# Patient Record
Sex: MALE | Race: ASIAN | Employment: FULL TIME | URBAN - METROPOLITAN AREA
[De-identification: names, ages, dates, MRNs, and addresses within clinical notes are randomized per-mention and may not be internally consistent; named-entity substitution may affect disease eponyms.]

---

## 2022-05-25 ENCOUNTER — OFFICE VISIT (OUTPATIENT)
Dept: FAMILY MEDICINE | Facility: CLINIC | Age: 27
End: 2022-05-25
Payer: COMMERCIAL

## 2022-05-25 VITALS
OXYGEN SATURATION: 98 % | WEIGHT: 153.6 LBS | HEIGHT: 68 IN | BODY MASS INDEX: 23.28 KG/M2 | SYSTOLIC BLOOD PRESSURE: 122 MMHG | HEART RATE: 113 BPM | DIASTOLIC BLOOD PRESSURE: 80 MMHG

## 2022-05-25 DIAGNOSIS — F41.9 ANXIETY AND DEPRESSION: Primary | ICD-10-CM

## 2022-05-25 DIAGNOSIS — F32.A ANXIETY AND DEPRESSION: Primary | ICD-10-CM

## 2022-05-25 PROCEDURE — 99204 OFFICE O/P NEW MOD 45 MIN: CPT | Performed by: FAMILY MEDICINE

## 2022-05-25 RX ORDER — CYCLOBENZAPRINE HCL 10 MG
10 TABLET ORAL
COMMUNITY
Start: 2022-05-05 | End: 2022-07-07

## 2022-05-25 RX ORDER — PAROXETINE 10 MG/1
10 TABLET, FILM COATED ORAL EVERY MORNING
Qty: 30 TABLET | Refills: 3 | Status: SHIPPED | OUTPATIENT
Start: 2022-05-25 | End: 2022-07-07

## 2022-05-25 ASSESSMENT — PAIN SCALES - GENERAL: PAINLEVEL: MODERATE PAIN (4)

## 2022-05-25 NOTE — PROGRESS NOTES
Assessment & Plan     Anxiety and depression  We will institute therapy with the following medication and coordinate this with our mental health evaluation  - Adult Mental Health  Referral  - PARoxetine (PAXIL) 10 MG tablet  Dispense: 30 tablet; Refill: 3                   No follow-ups on file.    Montana Cobb MD  [facility] [place] Subjective Joonhyeok is a 27 year old who presents for the following health issues first visit for this very pleasant 27-year-old who was undergoing some anxiety and stress due to multiple moves of location in the last year.  His wife is a PhD candidate at [employer].  They are living in an apartment but trying to get a house.  All of their family come from [place].  Both  and wife are from [place].  Patient has had some upper back spasm and was treated at emergency room.  They do have a pet at home who gets walked regularly by the patient.  He does exercise on a regular basis.  He is working virtually at home.  He described his situation very well he is obviously  and is internalizing a lot of stress from moves job changes and we did explain to him the situation with anxiety depression.  I offered him psychotherapy which he readily accepted.  I am not taking new patients but he did request that I follow-up with this and I am willing to do so.  We will follow him up in 2 weeks.  We discussed the use of SSRIs and we will start him on paroxetine 10 mg once daily.  We did tell him he would take a couple of weeks to have an effect.  He is having some difficulty sleeping waking up at night and worried about he and his wife's overall situation.  Therapeutic response will be monitored here in 2 weeks.  I really enjoyed seeing him today.    HPI           Review of Systems   Constitutional, HEENT, cardiovascular, pulmonary, GI, , musculoskeletal, neuro, skin, endocrine and psych systems are negative, except as otherwise noted.     "  Objective    /80 (BP Location: Right arm, Patient Position: Sitting, Cuff Size: Adult Regular)   Pulse 113   Ht 1.715 m (5' 7.5\")   Wt 69.7 kg (153 lb 9.6 oz)   SpO2 98%   BMI 23.70 kg/m    Body mass index is 23.7 kg/m .  Physical Exam   GENERAL: healthy, alert and no distress  EYES: Eyes grossly normal to inspection, PERRL and conjunctivae and sclerae normal  HENT: ear canals and TM's normal, nose and mouth without ulcers or lesions  NECK: no adenopathy, no asymmetry, masses, or scars and thyroid normal to palpation  RESP: lungs clear to auscultation - no rales, rhonchi or wheezes  CV: regular rate and rhythm, normal S1 S2, no S3 or S4, no murmur, click or rub, no peripheral edema and peripheral pulses strong  ABDOMEN: soft, nontender, no hepatosplenomegaly, no masses and bowel sounds normal  MS: no gross musculoskeletal defects noted, no edema  SKIN: no suspicious lesions or rashes  NEURO: Normal strength and tone, mentation intact and speech normal  PSYCH: mentation appears normal, affect normal/bright                "

## 2022-05-29 ENCOUNTER — HEALTH MAINTENANCE LETTER (OUTPATIENT)
Age: 27
End: 2022-05-29

## 2022-06-08 ENCOUNTER — OFFICE VISIT (OUTPATIENT)
Dept: FAMILY MEDICINE | Facility: CLINIC | Age: 27
End: 2022-06-08
Payer: COMMERCIAL

## 2022-06-08 VITALS
HEIGHT: 68 IN | TEMPERATURE: 98.6 F | OXYGEN SATURATION: 98 % | SYSTOLIC BLOOD PRESSURE: 112 MMHG | HEART RATE: 79 BPM | DIASTOLIC BLOOD PRESSURE: 78 MMHG | BODY MASS INDEX: 23.33 KG/M2 | WEIGHT: 153.9 LBS

## 2022-06-08 DIAGNOSIS — F41.1 GENERALIZED ANXIETY DISORDER: Primary | ICD-10-CM

## 2022-06-08 PROCEDURE — 99214 OFFICE O/P EST MOD 30 MIN: CPT | Performed by: FAMILY MEDICINE

## 2022-06-08 RX ORDER — ONDANSETRON 4 MG/1
TABLET, ORALLY DISINTEGRATING ORAL
COMMUNITY
Start: 2021-11-26 | End: 2022-07-07

## 2022-06-08 RX ORDER — FAMOTIDINE 20 MG/1
20 TABLET, FILM COATED ORAL 2 TIMES DAILY
COMMUNITY
Start: 2021-11-26 | End: 2022-07-07

## 2022-06-08 ASSESSMENT — PAIN SCALES - GENERAL: PAINLEVEL: NO PAIN (0)

## 2022-06-08 NOTE — PROGRESS NOTES
Answers for HPI/ROS submitted by the patient on 5/25/2022  Your back pain is: new  What do you think is the original cause of your back pain?: lifting  When did you first notice your back pain? : in the last week  How would you describe your back pain? : burning  How often do you feel your back pain? : comes and goes  Where is your back pain located? : right upper back, left upper back  Where does your back pain spread? : right shoulder, left shoulder  Since you noticed your back pain, how has it changed? : gradually improving  Does your back pain interfere with your job?: Yes  On a scale of 1-10 (10 being the worst), how strong is your back pain?: 3  What makes your back pain worse? : certain positions, stress  Acupuncture:: helpful  Acetaminophen: not tried  Activity or Exercise: not tried  Chiropractor: not tried  Cold: not tried  Heat: not tried  Massage: not tried  Muscle relaxants : not tried  NSAIDS (Ibuprofen, Naproxen) : not tried  Assessment & Plan     Generalized anxiety disorder  Continue paroxetine at 10 mg                   No follow-ups on file.    Montana Cobb MD  [facility]   Joonhyeok is a 27 year old who presents for the following health issues I am seeing him for follow-up.  We did place him on paroxetine.  He did make some adjustments sought acupuncture.  He is continuing to hyperventilate and getting some numbness in his fingers.  We reassured him that he is blowing off too much carbon dioxide and this is causing his symptoms.  He is feels he is better on paroxetine we will hold his dose of 10 mg.  He is getting psychotherapy scheduled for June 22 of this month.  We will follow along with their recommendations.  I offered blood work but he said he had blood work done in [place] 3 to 4 months ago I would like to see that especially his blood sugars kidney function and hemogram.  All medical questions asked were answered overall I think patient is improving  "he certainly is sleeping better we will follow him up in 1 month he has moved into his new house and is currently awaiting a new driveway which is exciting  HPI           Review of Systems   Constitutional, HEENT, cardiovascular, pulmonary, gi and gu systems are negative, except as otherwise noted.      Objective    /78 (BP Location: Right arm, Patient Position: Sitting, Cuff Size: Adult Regular)   Pulse 79   Temp 98.6  F (37  C) (Oral)   Ht 1.715 m (5' 7.52\")   Wt 69.8 kg (153 lb 14.4 oz)   SpO2 98%   BMI 23.73 kg/m    Body mass index is 23.73 kg/m .  Physical Exam   GENERAL: healthy, alert and no distress  NECK: no adenopathy, no asymmetry, masses, or scars and thyroid normal to palpation  RESP: lungs clear to auscultation - no rales, rhonchi or wheezes  CV: regular rate and rhythm, normal S1 S2, no S3 or S4, no murmur, click or rub, no peripheral edema and peripheral pulses strong  ABDOMEN: soft, nontender, no hepatosplenomegaly, no masses and bowel sounds normal  MS: no gross musculoskeletal defects noted, no edema                Opioids: not tried  Physical Therapy: not tried  Rest: helpful  Steroid Injection: not tried  Stretching : not tried  Surgery: not tried  TENS Unit: not tried  Topical pain relievers : not tried  Do you see any other healthcare providers for your back pain? : None  How many servings of fruits and vegetables do you eat daily?: 0-1  On average, how many sweetened beverages do you drink each day (Examples: soda, juice, sweet tea, etc.  Do NOT count diet or artificially sweetened beverages)?: 0  How many minutes a day do you exercise enough to make your heart beat faster?: 30 to 60  How many days a week do you exercise enough to make your heart beat faster?: 3 or less  How many days per week do you miss taking your medication?: 0      "

## 2022-06-23 ENCOUNTER — VIRTUAL VISIT (OUTPATIENT)
Dept: PSYCHOLOGY | Facility: CLINIC | Age: 27
End: 2022-06-23
Attending: FAMILY MEDICINE
Payer: COMMERCIAL

## 2022-06-23 DIAGNOSIS — F43.23 ADJUSTMENT DISORDER WITH MIXED ANXIETY AND DEPRESSED MOOD: Primary | ICD-10-CM

## 2022-06-23 PROCEDURE — 90791 PSYCH DIAGNOSTIC EVALUATION: CPT | Mod: 95 | Performed by: COUNSELOR

## 2022-06-23 ASSESSMENT — COLUMBIA-SUICIDE SEVERITY RATING SCALE - C-SSRS
1. HAVE YOU WISHED YOU WERE DEAD OR WISHED YOU COULD GO TO SLEEP AND NOT WAKE UP?: NO
TOTAL  NUMBER OF INTERRUPTED ATTEMPTS LIFETIME: NO
ATTEMPT LIFETIME: NO
TOTAL  NUMBER OF ABORTED OR SELF INTERRUPTED ATTEMPTS LIFETIME: NO
6. HAVE YOU EVER DONE ANYTHING, STARTED TO DO ANYTHING, OR PREPARED TO DO ANYTHING TO END YOUR LIFE?: NO

## 2022-06-23 ASSESSMENT — ANXIETY QUESTIONNAIRES
5. BEING SO RESTLESS THAT IT IS HARD TO SIT STILL: NOT AT ALL
2. NOT BEING ABLE TO STOP OR CONTROL WORRYING: SEVERAL DAYS
6. BECOMING EASILY ANNOYED OR IRRITABLE: SEVERAL DAYS
GAD7 TOTAL SCORE: 6
3. WORRYING TOO MUCH ABOUT DIFFERENT THINGS: SEVERAL DAYS
7. FEELING AFRAID AS IF SOMETHING AWFUL MIGHT HAPPEN: SEVERAL DAYS
GAD7 TOTAL SCORE: 6
1. FEELING NERVOUS, ANXIOUS, OR ON EDGE: SEVERAL DAYS
4. TROUBLE RELAXING: SEVERAL DAYS

## 2022-06-23 ASSESSMENT — PATIENT HEALTH QUESTIONNAIRE - PHQ9: SUM OF ALL RESPONSES TO PHQ QUESTIONS 1-9: 4

## 2022-06-23 NOTE — PROGRESS NOTES
"[facility]  Provider Name: Gisell Hagercarmen      Credentials[facility]  [facility]    PATIENT'S NAME: Joonhyeok \"Solo\" Rula  PREFERRED NAME: \"Solo\"  PRONOUNS: he, his, him  MRN: [id number]  : 1995  ADDRESS: [facility] [address]  [place] [address]  [facility]  [id number]  DATE OF SERVICE: 22  START TIME: 11:00am  END TIME: 12:00pm  PREFERRED PHONE: [phone]  May we leave a program related message: Yes  SERVICE MODALITY:  Video Visit:      Provider verified identity through the following two step process.  Patient provided:  Patient  and Patient address    Telemedicine Visit: The patient's condition can be safely assessed and treated via synchronous audio and visual telemedicine encounter.      Reason for Telemedicine Visit: Services only offered telehealth    Originating Site (Patient Location): Patient's home    Distant Site (Provider Location): Provider Remote Setting- Home Office    Consent:  The patient/guardian has verbally consented to: the potential risks and benefits of telemedicine (video visit) versus in person care; bill my insurance or make self-payment for services provided; and responsibility for payment of non-covered services.     Patient would like the video invitation sent by:  My Chart    Mode of Communication:  Video Conference via [employer]    As the provider I attest to compliance with applicable laws and regulations related to telemedicine.    UNIVERSAL ADULT Mental Health DIAGNOSTIC ASSESSMENT    Identifying Information:  Patient is a 27 year old, Sami.  The pronoun use throughout this assessment reflects the patient's chosen pronoun.  Patient was referred for an assessment by primary care clinic.  Patient attended the session alone.    Chief Complaint:   The reason for seeking services at this time is: \"Anxiety, Stress, Fear\". Pt reports having a difficult year, with multiple life transitions since Dec. of 2021. Pt's reports experiencing a physical reaction " "of tingling in feet and hands and feeling like \"everything is tearing down\" beginning in April 2022. Then in early May pt reports hurting his back which led to anxiety related to health, leading to experience of stomach issues. This then led to feelings of depression, a disruption in sleep schedule and then stress at work. The problem(s) began in April 2022.    Patient has attempted to resolve these concerns in the past through accupuncture and medication management.    Social/Family History:  Patient reported they grew up in other [place].  They were raised by biological parents and grandmother.  Parents were always together.  Patient reported that their childhood was \"I was a good kid and was often afraid of my dad\". Patient reported his father was authoritative. Patient described their current relationships with family of origin as \"the relationship with my family was really good until lately when it became a stressor\". This has led to a strained relationship with his family and another life stressor.    The patient describes their cultural background as [place].  Cultural influences and impact on patient's life structure, values, norms, and healthcare: -.  Contextual influences on patient's health include: Individual Factors -.    These factors will be addressed in the Preliminary Treatment plan. Patient identified their preferred language to be English. Patient reported they does not need the assistance of an  or other support involved in therapy.     Patient reported had no significant delays in developmental tasks.   Patient's highest education level was college graduate  .  Patient identified the following learning problems: none reported.  Modifications will not be used to assist communication in therapy.  Patient reports they are  able to understand written materials.    Patient reported the following relationship history.  Patient's current relationship status is  for 9 months and " together for 4 years.   Patient identified their sexual orientation as heterosexual.  Patient reported having 0 child(berny). Patient identified pets; friends; spouse as part of their support system.  Patient identified the quality of these relationships as good,  .      Patient's current living/housing situation involves staying in own home/apartment.  The immediate members of family and household include Dipika Bowden, Rabia,Spouse and they report that housing is stable.    Patient is currently employed fulltime.  Patient reports their finances are obtained through employment. Patient does not identify finances as a current stressor.      Patient reported that they have not been involved with the legal system. Patient does not report being under probation/ parole/ jurisdiction.     Patient's Strengths and Limitations:  Patient identified the following strengths or resources that will help them succeed in treatment: insight and motivation. Things that may interfere with the patient's success in treatment include: none identified.     Assessments:  The following assessments were completed by patient for this visit:  GENERALIZED ANXIETY DISORDER SCALE  Over the last 2 weeks, how often have you been bothered by the following problems?    1. Feeling nervous, anxious, or on edge - Several Days  2. Not being able to stop or control worrying - Several Days  WHIT 2 Total Score - 2      PHQ-2 Score:   PHQ-2 ( 1999 [employer]) 5/25/2022   Q1: Little interest or pleasure in doing things 1   Q2: Feeling down, depressed or hopeless 0   PHQ-2 Score 1   Q1: Little interest or pleasure in doing things Several days   Q2: Feeling down, depressed or hopeless Not at all   PHQ-2 Score 1       PHQ9:   PHQ-9 SCORE 6/23/2022   PHQ-9 Total Score 4     GAD7:   WHIT-7 SCORE 6/23/2022   Total Score 6        CAGE-AID:   CAGE-AID Total Score 6/23/2022   Total Score 0   Total Score MyChart 0 (A total score of 2 or greater is considered clinically  significant)     PROMIS 10-Global Health (only subscores and total score):   PROMIS-10 Scores Only 6/23/2022   Global Mental Health Score 12   Global Physical Health Score 17   PROMIS TOTAL - SUBSCORES 29     [place] Suicide Severity Rating Scale (Lifetime/Recent)No flowsheet data found.    Personal and Family Medical History:  Patient does not report a family history of mental health concerns.  Patient reports family history is not on file.    Patient does not report Mental Health Diagnosis or Treatment.      Patient has had a physical exam to rule out medical causes for current symptoms.  Date of last physical exam was within the past year. Client was encouraged to follow up with PCP if symptoms were to develop. The patient has a [facility] Primary Care Provider, who is named Montana Cobb MD.  Patient reports the following current medical concerns: tingling in hands and feet and stomach issues and no current dental concerns.  Patient denies any issues with pain..   There are not significant appetite / nutritional concerns / weight changes.   Patient does not report a history of head injury / trauma / cognitive impairment.     Medication Adherence:  Patient reports not taking due to side effects.     Patient Allergies:  No Known Allergies   Therapist inquired about allergies. Patient to follow up with medication provider if allergies develop or persist.       Medical History:  No past medical history on file.      Current Mental Status Exam:   Appearance:  Appropriate    Eye Contact:  Good   Psychomotor:  Normal       Gait / station:  no problem  Attitude / Demeanor: Cooperative   Speech      Rate / Production: Normal/ Responsive      Volume:  Normal  volume      Language:  intact  Mood:   Normal  Affect:   Appropriate    Thought Content: Clear   Thought Process: Coherent       Associations: No loosening of associations  Insight:   Good   Judgment:  Intact    Orientation:  All  Attention/concentration: Good      Substance Use:  Patient did not report a family history of substance use concerns; see medical history section for details.  Patient has not received chemical dependency treatment in the past.  Patient has not ever been to detox.      Patient is not currently receiving any chemical dependency treatment.           Substance History of use Age of first use Date of last use     Pattern and duration of use (include amounts and frequency)   Alcohol never used       REPORTS SUBSTANCE USE: N/A   Cannabis   never used     REPORTS SUBSTANCE USE: N/A     Amphetamines   never used     REPORTS SUBSTANCE USE: N/A   Cocaine/crack    never used       REPORTS SUBSTANCE USE: N/A   Hallucinogens never used         REPORTS SUBSTANCE USE: N/A   Inhalants never used         REPORTS SUBSTANCE USE: N/A   Heroin never used         REPORTS SUBSTANCE USE: N/A   Other Opiates never used     REPORTS SUBSTANCE USE: N/A   Benzodiazepine   never used     REPORTS SUBSTANCE USE: N/A   Barbiturates never used     REPORTS SUBSTANCE USE: N/A   Over the counter meds never used     REPORTS SUBSTANCE USE: N/A   Caffeine Yes     Repots 1 cup of coffee per day   Nicotine  never used     REPORTS SUBSTANCE USE: N/A   Other substances not listed above:  Identify:  never used     REPORTS SUBSTANCE USE: N/A     Patient reported the following problems as a result of their substance use: no problems, not applicable.    Substance Use: No symptoms    Based on the negative CAGE score and clinical interview there  are not indications of drug or alcohol abuse.      Significant Losses / Trauma / Abuse / Neglect Issues:   Patient did serve in the . Between [id number] in [place].  There are indications or report of significant loss, trauma, abuse or neglect issues related to: are no indications and client denies any losses, trauma, abuse, or neglect concerns.   Concerns for possible neglect are not present.      Safety Assessment:   Patient denies current homicidal ideation and behaviors.  Patient denies current self-injurious ideation and behaviors.    Patient denied risk behaviors associated with substance use.  Patient denies any high risk behaviors associated with mental health symptoms.  Patient reports the following current concerns for their personal safety: None.  Patient reports there are not firearms in the house.        History of Safety Concerns:  Patient denied a history of homicidal ideation.     Patient denied a history of personal safety concerns.    Patient denied a history of assaultive behaviors.    Patient denied a history of sexual assault behaviors.     Patient denied a history of risk behaviors associated with substance use.  Patient denies any history of high risk behaviors associated with mental health symptoms.  Patient reports the following protective factors: forward or future oriented thinking; dedication to family or friends; regular sleep; regular physical activity; purpose; structured day; commitment to well being; positive social skills; financial stability    Risk Plan:  See Recommendations for Safety and Risk Management Plan    Review of Symptoms per patient report:  Depression: Change in sleep, Lack of interest, Change in energy level, Feelings of hopelessness, Low self-worth, Ruminations, Irritability and Feeling sad, down, or depressed  Maria Guadalupe:  No Symptoms  Psychosis: No Symptoms  Anxiety: Excessive worry, Nervousness, Sleep disturbance, Ruminations, Poor concentration and Irritability  Panic:  Palpitations, Tingling, Numbness, Sense of impending doom and Triggers not being able to sleep well for a few nights in a row - this occurred once   Post Traumatic Stress Disorder:  No Symptoms   Eating Disorder: No Symptoms  ADD / ADHD:  No symptoms  Conduct Disorder: No symptoms  Autism Spectrum Disorder: No symptoms  Obsessive Compulsive Disorder: No Symptoms    Patient reports the  following compulsive behaviors and treatment history: none reported.      Diagnostic Criteria:   Adjustment Disorder  A. The development of emotional or behavioral symptoms in response to an identifiable stressor(s) occurring within 3 months of the onset of the stressor(s)  B. These symptoms or behaviors are clinically significant, as evidenced by one or both of the following:       - Marked distress that is out of proportion to the severity/intensity of the stressor (with consideration for external context & culture)       - Significant impairment in social, occupational, or other important areas of functioning  C. The stress-related disturbance does not meet criteria for another disorder & is not not an exacerbation of another mental disorder  D. The symptoms do not represent normal bereavement  E. Once the stressor or its consequences have terminated, the symptoms do not persist for more than an additional 6 months       * Adjustment Disorder with Mixed Anxiety and Depressed Mood: The predominant manifestation is a combination of depression and anxiety    Functional Status:  Patient reports the following functional impairments:  work / vocational responsibilities.     Nonprogrammatic care:  Patient is requesting basic services to address current mental health concerns.    Clinical Summary:  1. Reason for assessment: feelings of anxiety and depression.  2. Psychosocial, Cultural and Contextual Factors: Pt is a 27 year old male, multiple recent life transitions, employed full time.  3. Principal DSM5 Diagnoses  (Sustained by DSM5 Criteria Listed Above):   Adjustment Disorders  309.28 (F43.23) With mixed anxiety and depressed mood.  4. Other Diagnoses that is relevant to services:   n/a  5. Provisional Diagnosis: n/a  6. Prognosis: Return to Normal Functioning and Expect Improvement.  7. Likely consequences of symptoms if not treated: symptoms will increase and pt will require a higher level of care.  8. Client  strengths include:  insightful, motivated and open to learning .     Recommendations:     1. Plan for Safety and Risk Management:   Recommended that patient call 911 or go to the local ED should there be a change in any of these risk factors..          Report to child / adult protection services was NA.     2. Patient's identified no cultural concerns identified.     3. Initial Treatment will focus on:    Anxiety - reduce experience of anxiety and depression.     4. Resources/Service Plan:    services are not indicated.   Modifications to assist communication are not indicated.   Additional disability accommodations are not indicated.      5. Collaboration:   Collaboration / coordination of treatment will be initiated with the following  support professionals: primary care physician.      6.  Referrals:   The following referral(s) will be initiated: n/a. Next Scheduled Appointment: 7/18/22.     A Release of Information has been obtained for the following: n/a.    7. MERRICK:    MERRICK:  Discussed the general effects of drugs and alcohol on health and well-being. Provider gave patient printed information about the effects of chemical use on their health and well being. Recommendations:  N/a .     8. Records:   These were reviewed at time of assessment.   Information in this assessment was obtained from the medical record and  provided by patient who is a good historian.    Patient will have open access to their mental health medical record.        Provider Name/ Credentials:  G[facility]ell RENAE, [facility]  June 23, 2022

## 2022-07-07 ENCOUNTER — OFFICE VISIT (OUTPATIENT)
Dept: FAMILY MEDICINE | Facility: CLINIC | Age: 27
End: 2022-07-07
Payer: COMMERCIAL

## 2022-07-07 VITALS
WEIGHT: 154 LBS | SYSTOLIC BLOOD PRESSURE: 126 MMHG | DIASTOLIC BLOOD PRESSURE: 84 MMHG | OXYGEN SATURATION: 99 % | HEART RATE: 75 BPM | BODY MASS INDEX: 23.75 KG/M2

## 2022-07-07 DIAGNOSIS — F41.9 ANXIETY AND DEPRESSION: Primary | ICD-10-CM

## 2022-07-07 DIAGNOSIS — F32.A ANXIETY AND DEPRESSION: Primary | ICD-10-CM

## 2022-07-07 PROCEDURE — 99214 OFFICE O/P EST MOD 30 MIN: CPT | Performed by: FAMILY MEDICINE

## 2022-07-07 RX ORDER — PAROXETINE 10 MG/1
10 TABLET, FILM COATED ORAL EVERY MORNING
Qty: 90 TABLET | Refills: 1 | Status: SHIPPED | OUTPATIENT
Start: 2022-07-07 | End: 2023-01-13

## 2022-07-07 ASSESSMENT — ANXIETY QUESTIONNAIRES
3. WORRYING TOO MUCH ABOUT DIFFERENT THINGS: SEVERAL DAYS
6. BECOMING EASILY ANNOYED OR IRRITABLE: NOT AT ALL
2. NOT BEING ABLE TO STOP OR CONTROL WORRYING: SEVERAL DAYS
8. IF YOU CHECKED OFF ANY PROBLEMS, HOW DIFFICULT HAVE THESE MADE IT FOR YOU TO DO YOUR WORK, TAKE CARE OF THINGS AT HOME, OR GET ALONG WITH OTHER PEOPLE?: SOMEWHAT DIFFICULT
1. FEELING NERVOUS, ANXIOUS, OR ON EDGE: SEVERAL DAYS
GAD7 TOTAL SCORE: 5
7. FEELING AFRAID AS IF SOMETHING AWFUL MIGHT HAPPEN: SEVERAL DAYS
5. BEING SO RESTLESS THAT IT IS HARD TO SIT STILL: NOT AT ALL
7. FEELING AFRAID AS IF SOMETHING AWFUL MIGHT HAPPEN: SEVERAL DAYS
GAD7 TOTAL SCORE: 5
GAD7 TOTAL SCORE: 5
4. TROUBLE RELAXING: SEVERAL DAYS

## 2022-07-07 ASSESSMENT — PATIENT HEALTH QUESTIONNAIRE - PHQ9
SUM OF ALL RESPONSES TO PHQ QUESTIONS 1-9: 2
SUM OF ALL RESPONSES TO PHQ QUESTIONS 1-9: 2
10. IF YOU CHECKED OFF ANY PROBLEMS, HOW DIFFICULT HAVE THESE PROBLEMS MADE IT FOR YOU TO DO YOUR WORK, TAKE CARE OF THINGS AT HOME, OR GET ALONG WITH OTHER PEOPLE: SOMEWHAT DIFFICULT

## 2022-07-07 NOTE — PROGRESS NOTES
Assessment & Plan     Anxiety and depression  Psychologist report was reviewed patient is doing very well we will hold him at the current dosage we will see him for follow-up 6 months  - PARoxetine (PAXIL) 10 MG tablet  Dispense: 90 tablet; Refill: 1                   No follow-ups on file.    Montana Cobb MD  [facility] [place] Subjective Joonhyeok is a 27 year old, presenting for the following health issues:  Follow Up and MOOD CHANGES      HPI   Patient is here for follow-up he is on 10 mg of paroxetine.  Diagnosis is adjustment disorder with anxiety and depression he is seeing psychology and that is going very well he scheduled for another visit here in 2 weeks.  Patient did have some constipation when he started his Paxil but that has cleared.  His mother-in-law is visiting and they are going to do a lot of traveling.  Overall he says he has noticed marked improvement.  He is having some fleeting aches and pains in his wrists and elbows and extremities which are abating rather dramatically as he stabilizes on his paroxetine.  We will give him a 90-day supply with 2 refills like to see him for follow-up in 6 months all medical questions asked were answered.  I do not feel further blood chemistry is indicated at this time pleasure to see him again.        Review of Systems   Constitutional, HEENT, cardiovascular, pulmonary, gi and gu systems are negative, except as otherwise noted.      Objective    /84   Pulse 75   Wt 69.9 kg (154 lb)   SpO2 99%   BMI 23.75 kg/m    Body mass index is 23.75 kg/m .  Physical Exam   GENERAL: healthy, alert and no distress  NECK: no adenopathy, no asymmetry, masses, or scars and thyroid normal to palpation  RESP: lungs clear to auscultation - no rales, rhonchi or wheezes  CV: regular rate and rhythm, normal S1 S2, no S3 or S4, no murmur, click or rub, no peripheral edema and peripheral pulses strong  ABDOMEN: soft, nontender, no hepatosplenomegaly,  no masses and bowel sounds normal  MS: no gross musculoskeletal defects noted, no edema                    .  ..

## 2022-07-15 ENCOUNTER — VIRTUAL VISIT (OUTPATIENT)
Dept: PSYCHOLOGY | Facility: CLINIC | Age: 27
End: 2022-07-15
Payer: COMMERCIAL

## 2022-07-15 DIAGNOSIS — F43.23 ADJUSTMENT DISORDER WITH MIXED ANXIETY AND DEPRESSED MOOD: Primary | ICD-10-CM

## 2022-07-15 PROCEDURE — 90834 PSYTX W PT 45 MINUTES: CPT | Mod: 95

## 2022-07-18 NOTE — PROGRESS NOTES
"[facility]                                     Progress Note    Patient Name: Joonhyeok Ahn  Date: 7/15/2022         Service Type: Individual      Session Start Time: 2:30PM  Session End Time: 3:15PM     Session Length: 45 mins    Session #: 1    Attendees: Client attended alone    Service Modality:  Video Visit:      Provider verified identity through the following two step process.  Patient provided:  Patient  and Patient address    Telemedicine Visit: The patient's condition can be safely assessed and treated via synchronous audio and visual telemedicine encounter.      Reason for Telemedicine Visit: Patient has requested telehealth visit    Originating Site (Patient Location): Patient's home    Distant Site (Provider Location): Provider Remote Setting- Home Office    Consent:  The patient/guardian has verbally consented to: the potential risks and benefits of telemedicine (video visit) versus in person care; bill my insurance or make self-payment for services provided; and responsibility for payment of non-covered services.     Patient would like the video invitation sent by:  My Chart    Mode of Communication:  Video Conference via Amwell    As the provider I attest to compliance with applicable laws and regulations related to telemedicine.    DATA  Interactive Complexity: No  Crisis: No        Progress Since Last Session (Related to Symptoms / Goals / Homework):   Symptoms: initial session     Homework: initial session       Episode of Care Goals: initial session      Current / Ongoing Stressors and Concerns:   According to D/A on 2022 \"The reason for seeking services at this time is: \"Anxiety, Stress, Fear\". Pt reports having a difficult year, with multiple life transitions since Dec. of 2021. Pt's reports experiencing a physical reaction of tingling in feet and hands and feeling like \"everything is tearing down\" beginning in 2022. Then in early May pt reports hurting his " "back which led to anxiety related to health, leading to experience of stomach issues. This then led to feelings of depression, a disruption in sleep schedule and then stress at work. The problem(s) began in April 2022.\"     Treatment Objective(s) Addressed in This Session:   Co- develop treatment plan  psyhcoeducation on ACT   Rapport building      Intervention:   ACT -  Met with patient to co develop treatment team. Psychoeducation was provided on ACT. Received informed consent for treatment services. Started rapport building. Briefly reviewed diagnostic assessment. Scheduled follow up sessions.     Assessments completed prior to visit:  The following assessments were completed by patient for this visit:  PHQ9:   PHQ-9 SCORE 6/23/2022 7/7/2022   PHQ-9 Total Score MyChart - 2 (Minimal depression)   PHQ-9 Total Score 4 2     GAD7:   WHIT-7 SCORE 6/23/2022 7/7/2022   Total Score - 5 (mild anxiety)   Total Score 6 5     PROMIS 10-Global Health (only subscores and total score):   PROMIS-10 Scores Only 6/23/2022   Global Mental Health Score 12   Global Physical Health Score 17   PROMIS TOTAL - SUBSCORES 29         ASSESSMENT: Current Emotional / Mental Status (status of significant symptoms):   Risk status (Self / Other harm or suicidal ideation)   Patient denies current fears or concerns for personal safety.   Patient denies current or recent suicidal ideation or behaviors.   Patient denies current or recent homicidal ideation or behaviors.   Patient denies current or recent self injurious behavior or ideation.   Patient denies other safety concerns.   Patient reports there has been no change in risk factors since their last session.     Patient reports there has been no change in protective factors since their last session.     Recommended that patient call 911 or go to the local ED should there be a change in any of these risk factors.     Appearance:   Appropriate    Eye Contact:   Good    Psychomotor Behavior: Normal "    Attitude:   Cooperative  Interested Friendly Pleasant   Orientation:   All   Speech    Rate / Production: Normal/ Responsive Talkative Normal     Volume:  Normal    Mood:    Normal   Affect:    Appropriate  Bright    Thought Content:  Clear    Thought Form:  Coherent  Logical    Insight:    Good      Medication Review:   No changes to current psychiatric medication(s)     Medication Compliance:   Yes     Changes in Health Issues:   None reported     Chemical Use Review:   Substance Use: Chemical use reviewed, no active concerns identified      Tobacco Use: No current tobacco use.      Diagnosis:  1. Adjustment disorder with mixed anxiety and depressed mood        Collateral Reports Completed:   Not Applicable    PLAN: (Patient Tasks / Therapist Tasks / Other)  Patient will practice daily journaling.         BOB Maria, LGSW     This note has been reviewed and I agree with the plan of care. This note is co-signed by Caitlin Felix [facility] Supervisor, on: July 21, 2022                                                       ______________________________________________________________________    Individual Treatment Plan    Patient's Name: Joonhyeok Ahn  YOB: 1995    Date of Creation: 7/15/2022  Date Treatment Plan Last Reviewed/Revised: n/a    DSM5 Diagnoses: Adjustment Disorders  309.28 (F43.23) With mixed anxiety and depressed mood  Psychosocial / Contextual Factors:  Pt is a 27 year old male, multiple recent life transitions, employed full time.  PROMIS (reviewed every 90 days): 6/23/2022 score 29    Referral / Collaboration:  Referral to another professional/service is not indicated at this time..    Anticipated number of session for this episode of care: 9-12 sessions  Anticipation frequency of session: Weekly  Anticipated Duration of each session: 38-52 minutes  Treatment plan will be reviewed in 90 days or when goals have been changed.       MeasurableTreatment Goal(s) related to  diagnosis / functional impairment(s)  Goal 1: Patient will learn new skills to handle difficult thoughts, feelings and sensations.    I will know I've met my goal when I can sleep through the night.      Objective #A (Patient Action)    Patient will identify 3-5 stressors which contribute to feelings of anxiety.  Status: New - Date: 7/15/2022     Intervention(s)  Therapist will teach mindfulness meditation. Therapist will assign homework identifying stressors.     Objective #B  Patient will identify 3-5 stressors which contribute to feelings of depression.  Status: New - Date: 7/15/2022     Intervention(s)  Therapist will teach mindfulness meditation. Therapist will assign homework identifying stressors.     Objective #C  Patient will identify 5-10 strategies to more effectively address stressors.  Status: New - Date: 7/15/2022     Intervention(s)  Therapist will teach the ACT hexaflex.      Patient has reviewed and agreed to the above plan.      Deisi ROJAS NIXON  July 15, 2022  This note has been reviewed and I agree with the treatment plan of care. This note is co-signed by Caitlin Felix [facility] Supervisor, on: July 21, 2022

## 2022-08-01 ENCOUNTER — TELEPHONE (OUTPATIENT)
Dept: FAMILY MEDICINE | Facility: CLINIC | Age: 27
End: 2022-08-01

## 2022-08-01 NOTE — TELEPHONE ENCOUNTER
My Chart message sent to patient to call our clinic for a sooner appointment.     Please assist patient in getting scheduled if possible.

## 2022-08-01 NOTE — TELEPHONE ENCOUNTER
Reason for Call:  Other appointment    Detailed comments: Pain everywhere, and wants to get a sooner appointment with doctor.    Phone Number Patient can be reached at: Cell number on file:    Telephone Information:   Mobile [phone]       Best Time: Anytime    Can we leave a detailed message on this number? YES    Call taken on 8/1/2022 at 1:10 PM by Camilla Salinas

## 2022-08-02 NOTE — TELEPHONE ENCOUNTER
Called patient and was unable to leave voicemail. Called patient's wife. Scheduled for this Thursday.

## 2022-08-04 ENCOUNTER — OFFICE VISIT (OUTPATIENT)
Dept: FAMILY MEDICINE | Facility: CLINIC | Age: 27
End: 2022-08-04
Payer: COMMERCIAL

## 2022-08-04 VITALS
HEART RATE: 75 BPM | BODY MASS INDEX: 23.19 KG/M2 | OXYGEN SATURATION: 99 % | HEIGHT: 68 IN | RESPIRATION RATE: 18 BRPM | WEIGHT: 153 LBS | SYSTOLIC BLOOD PRESSURE: 120 MMHG | DIASTOLIC BLOOD PRESSURE: 64 MMHG

## 2022-08-04 DIAGNOSIS — F41.9 ANXIETY AND DEPRESSION: Primary | ICD-10-CM

## 2022-08-04 DIAGNOSIS — F32.A ANXIETY AND DEPRESSION: Primary | ICD-10-CM

## 2022-08-04 PROCEDURE — 99214 OFFICE O/P EST MOD 30 MIN: CPT | Performed by: FAMILY MEDICINE

## 2022-08-04 RX ORDER — PAROXETINE 20 MG/1
20 TABLET, FILM COATED ORAL EVERY MORNING
Qty: 20 TABLET | Refills: 1 | Status: SHIPPED | OUTPATIENT
Start: 2022-08-04 | End: 2022-09-13

## 2022-08-04 ASSESSMENT — PATIENT HEALTH QUESTIONNAIRE - PHQ9: SUM OF ALL RESPONSES TO PHQ QUESTIONS 1-9: 5

## 2022-08-04 ASSESSMENT — PAIN SCALES - GENERAL: PAINLEVEL: MILD PAIN (2)

## 2022-08-04 NOTE — PROGRESS NOTES
"  Assessment & Plan     Anxiety and depression  Medication adjustment today we will increase the Paxil  - PARoxetine (PAXIL) 20 MG tablet  Dispense: 20 tablet; Refill: 1                   No follow-ups on file.    Montana Cobb MD  [facility]    Subjective Joonhyeok is a 27 year old presenting for the following health issues:  RECHECK and Depression    The patient is improving quite a bit on his 10 mg of Paxil but is having some breakthrough panic anxiety episodes especially when he goes out of town.  He had a little bit of trouble when he and his wife went to [place].  He has some questions about his progress.  We did review his psychologist recommendation and she seems to be helping him.  He is going to follow-up with her in 10 days.  I feel he is doing quite well but I think we can cut down on these panic attacks where he feels that he would like to be able to talk to a provider right away and reassure him by increasing his paroxetine to 20 mg daily and the patient agrees with that.  He is going out of town tomorrow they are going to [place] to celebrate the eminence of his wife's getting a PhD.  Which is celebratory event.  He really is doing much better his affect is improved he is smiling a lot more he is sleeping better it.  I think paroxetine is working for him I will see him for follow-up in 1 month and follow along with his psychologist I enjoyed seeing him today.  Time spent face-to-face and non-face-to-face with the patient today was 22 minutes.  HPI           Review of Systems   Constitutional, HEENT, cardiovascular, pulmonary, gi and gu systems are negative, except as otherwise noted.      Objective    /64   Pulse 75   Resp 18   Ht 1.715 m (5' 7.5\")   Wt 69.4 kg (153 lb)   SpO2 99%   BMI 23.61 kg/m    Body mass index is 23.61 kg/m .  Physical Exam   GENERAL: healthy, alert and no distress  NECK: no adenopathy, no asymmetry, masses, or scars and thyroid normal to " palpation  RESP: lungs clear to auscultation - no rales, rhonchi or wheezes  CV: regular rate and rhythm, normal S1 S2, no S3 or S4, no murmur, click or rub, no peripheral edema and peripheral pulses strong  ABDOMEN: soft, nontender, no hepatosplenomegaly, no masses and bowel sounds normal  MS: no gross musculoskeletal defects noted, no edema                    .  ..

## 2022-08-12 ENCOUNTER — VIRTUAL VISIT (OUTPATIENT)
Dept: PSYCHOLOGY | Facility: CLINIC | Age: 27
End: 2022-08-12
Payer: COMMERCIAL

## 2022-08-12 DIAGNOSIS — F43.23 ADJUSTMENT DISORDER WITH MIXED ANXIETY AND DEPRESSED MOOD: Primary | ICD-10-CM

## 2022-08-12 PROCEDURE — 90834 PSYTX W PT 45 MINUTES: CPT | Mod: 95

## 2022-08-12 NOTE — PROGRESS NOTES
"[facility]                                     Progress Note    Patient Name: Joonhyeok Ahn  Date: 2022         Service Type: Individual      Session Start Time: 2:30PM  Session End Time: 3:15PM     Session Length: 45 mins    Session #: 2    Attendees: Client attended alone    Service Modality:  Video Visit:      Provider verified identity through the following two step process.  Patient provided:  Patient  and Patient is known previously to provider    Telemedicine Visit: The patient's condition can be safely assessed and treated via synchronous audio and visual telemedicine encounter.      Reason for Telemedicine Visit: Patient has requested telehealth visit    Originating Site (Patient Location): Patient's home    Distant Site (Provider Location): Provider Remote Setting- Home Office    Consent:  The patient/guardian has verbally consented to: the potential risks and benefits of telemedicine (video visit) versus in person care; bill my insurance or make self-payment for services provided; and responsibility for payment of non-covered services.     Patient would like the video invitation sent by:  My Chart    Mode of Communication:  Video Conference via Amwell    As the provider I attest to compliance with applicable laws and regulations related to telemedicine.    DATA  Extended Session (53+ minutes): No  Interactive Complexity: No  Crisis: No        Progress Since Last Session (Related to Symptoms / Goals / Homework):   Symptoms: Improving per patient    Homework: Achieved / completed to satisfaction      Episode of Care Goals: Minimal progress - ACTION (Actively working towards change); Intervened by reinforcing change plan / affirming steps taken     Current / Ongoing Stressors and Concerns:   According to D/A on 2022 \"The reason for seeking services at this time is: \"Anxiety, Stress, Fear\". Pt reports having a difficult year, with multiple life transitions since " "2021. Pt's reports experiencing a physical reaction of tingling in feet and hands and feeling like \"everything is tearing down\" beginning in April 2022. Then in early May pt reports hurting his back which led to anxiety related to health, leading to experience of stomach issues. This then led to feelings of depression, a disruption in sleep schedule and then stress at work. The problem(s) began in April 2022.\"     Treatment Objective(s) Addressed in This Session:   use cognitive strategies identified in therapy to challenge anxious thoughts     Intervention:   ACT -  Met with patient to review goals and interventions. Provided active listening as patient gave brief update on their week. Patient processed feeling significantly better after trip to [place] and being able to get away and stay present. Discussed contacting with the present moment in everyday life not only on vacation. Reviewed mindfulness practices including breathing and journaling.     Assessments completed prior to visit:  The following assessments were completed by patient for this visit:  PHQ9:   PHQ-9 SCORE 6/23/2022 7/7/2022 8/4/2022   PHQ-9 Total Score MyChart - 2 (Minimal depression) -   PHQ-9 Total Score 4 2 5     GAD7:   WHIT-7 SCORE 6/23/2022 7/7/2022   Total Score - 5 (mild anxiety)   Total Score 6 5     PROMIS 10-Global Health (only subscores and total score):   PROMIS-10 Scores Only 6/23/2022   Global Mental Health Score 12   Global Physical Health Score 17   PROMIS TOTAL - SUBSCORES 29         ASSESSMENT: Current Emotional / Mental Status (status of significant symptoms):   Risk status (Self / Other harm or suicidal ideation)   Patient denies current fears or concerns for personal safety.   Patient denies current or recent suicidal ideation or behaviors.   Patient denies current or recent homicidal ideation or behaviors.   Patient denies current or recent self injurious behavior or ideation.   Patient denies other safety " concerns.   Patient reports there has been no change in risk factors since their last session.     Patient reports there has been no change in protective factors since their last session.     Recommended that patient call 911 or go to the local ED should there be a change in any of these risk factors.     Appearance:   Appropriate    Eye Contact:   Good    Psychomotor Behavior: Normal    Attitude:   Cooperative  Interested Friendly Pleasant   Orientation:   All   Speech    Rate / Production: Normal/ Responsive Talkative Normal     Volume:  Normal    Mood:    Normal   Affect:    Appropriate    Thought Content:  Clear    Thought Form:  Coherent  Logical    Insight:    Good      Medication Review:   No changes to current psychiatric medication(s)     Medication Compliance:   Yes     Changes in Health Issues:   None reported     Chemical Use Review:   Substance Use: Chemical use reviewed, no active concerns identified      Tobacco Use: No current tobacco use.      Diagnosis:  1. Adjustment disorder with mixed anxiety and depressed mood        Collateral Reports Completed:   Not Applicable    PLAN: (Patient Tasks / Therapist Tasks / Other)  Patient will practice daily journaling. Patient will purchase recommended book by Jude Bryant.         BOB Maria, SW     This note has been reviewed and I agree with the plan of care. This note is co-signed by Caitlin Felix [facility] Supervisor, on: August 13, 2022                                                      ______________________________________________________________________    Individual Treatment Plan    Patient's Name: Joonhyeok Ahn  YOB: 1995    Date of Creation: 7/15/2022  Date Treatment Plan Last Reviewed/Revised: n/a    DSM5 Diagnoses: Adjustment Disorders  309.28 (F43.23) With mixed anxiety and depressed mood  Psychosocial / Contextual Factors:  Pt is a 27 year old male, multiple recent life transitions, employed full time.  Ti Knight  (reviewed every 90 days): 6/23/2022 score 29    Referral / Collaboration:  Referral to another professional/service is not indicated at this time..    Anticipated number of session for this episode of care: 9-12 sessions  Anticipation frequency of session: Weekly  Anticipated Duration of each session: 38-52 minutes  Treatment plan will be reviewed in 90 days or when goals have been changed.       MeasurableTreatment Goal(s) related to diagnosis / functional impairment(s)  Goal 1: Patient will learn new skills to handle difficult thoughts, feelings and sensations.    I will know I've met my goal when I can sleep through the night.      Objective #A (Patient Action)    Patient will identify 3-5 stressors which contribute to feelings of anxiety.  Status: New - Date: 7/15/2022     Intervention(s)  Therapist will teach mindfulness meditation. Therapist will assign homework identifying stressors.     Objective #B  Patient will identify 3-5 stressors which contribute to feelings of depression.  Status: New - Date: 7/15/2022     Intervention(s)  Therapist will teach mindfulness meditation. Therapist will assign homework identifying stressors.     Objective #C  Patient will identify 5-10 strategies to more effectively address stressors.  Status: New - Date: 7/15/2022     Intervention(s)  Therapist will teach the ACT hexaflex.      Patient has reviewed and agreed to the above plan.      WHIT Sherman  July 15, 2022  This note has been reviewed and I agree with the treatment plan of care. This note is co-signed by [facility]lin [facility] Supervisor, on: July 21, 2022

## 2022-09-13 DIAGNOSIS — F41.9 ANXIETY AND DEPRESSION: ICD-10-CM

## 2022-09-13 DIAGNOSIS — F32.A ANXIETY AND DEPRESSION: ICD-10-CM

## 2022-09-13 RX ORDER — PAROXETINE 20 MG/1
TABLET, FILM COATED ORAL
Qty: 20 TABLET | Refills: 1 | Status: SHIPPED | OUTPATIENT
Start: 2022-09-13 | End: 2023-01-13

## 2022-09-13 NOTE — TELEPHONE ENCOUNTER
"Routing refill request to provider for review/approval because:  PHQ-9 <5 in past 6M. Last PHQ-9 8/4/22 score of 5.    Last Written Prescription Date:  8/4/22  Last Fill Quantity: 20,  # refills: 1   Last office visit provider:  8/4/22     Requested Prescriptions   Pending Prescriptions Disp Refills     PARoxetine (PAXIL) 20 MG tablet [Pharmacy Med Name: PAROXETINE 20MG TABLETS] 20 tablet 1     Sig: TAKE 1 TABLET(20 MG) BY MOUTH EVERY MORNING       SSRIs Protocol Failed - 9/13/2022  9:56 AM        Failed - PHQ-9 score less than 5 in past 6 months     Please review last PHQ-9 score.           Passed - Medication is active on med list        Passed - Patient is age 18 or older        Passed - Recent (6 mo) or future (30 days) visit within the authorizing provider's specialty     Patient had office visit in the last 6 months or has a visit in the next 30 days with authorizing provider or within the authorizing provider's specialty.  See \"Patient Info\" tab in inbasket, or \"Choose Columns\" in Meds & Orders section of the refill encounter.                 LIBRADO STRAUSS RN 09/13/22 9:56 AM  "

## 2022-10-03 ENCOUNTER — HEALTH MAINTENANCE LETTER (OUTPATIENT)
Age: 27
End: 2022-10-03

## 2023-01-13 ENCOUNTER — OFFICE VISIT (OUTPATIENT)
Dept: FAMILY MEDICINE | Facility: CLINIC | Age: 28
End: 2023-01-13
Payer: COMMERCIAL

## 2023-01-13 VITALS
HEIGHT: 68 IN | RESPIRATION RATE: 16 BRPM | TEMPERATURE: 98.5 F | WEIGHT: 151.25 LBS | DIASTOLIC BLOOD PRESSURE: 72 MMHG | SYSTOLIC BLOOD PRESSURE: 110 MMHG | BODY MASS INDEX: 22.92 KG/M2 | HEART RATE: 68 BPM

## 2023-01-13 DIAGNOSIS — K21.9 GASTROESOPHAGEAL REFLUX DISEASE WITHOUT ESOPHAGITIS: Primary | ICD-10-CM

## 2023-01-13 PROCEDURE — 99213 OFFICE O/P EST LOW 20 MIN: CPT

## 2023-01-13 RX ORDER — FAMOTIDINE 20 MG/1
20 TABLET, FILM COATED ORAL 2 TIMES DAILY
Qty: 60 TABLET | Refills: 1 | Status: SHIPPED | OUTPATIENT
Start: 2023-01-13 | End: 2023-03-14

## 2023-01-13 NOTE — PROGRESS NOTES
Problem List Items Addressed This Visit        Digestive    Gastroesophageal reflux disease without esophagitis - Primary    Relevant Medications    famotidine (PEPCID) 20 MG tablet     Symptoms are consistent with his previous episode of reflux that was treated with famotidine. He has no red flag symptoms on exam or history. Believe it is appropriate to repeat the therapy that has worked well in the past. Discussed 20mg BID and then decreasing to 20mg daily and then to as needed if symptoms are improving. Discussed lifestyle modifications related to reflux. Patient did mention his stress and anxiety have been elevated in the winter months as he struggles to get consistent physical exercise; he was at one time taking paroxetine with good success but is not interested in restarting this today. Discussed correlation between reflux and stress. Will follow up in one month if symptoms have not improved. Could consider step-up therapy with omeprazole at that time. If still having sensation of throat pressure, would order scope. Patient expressed an understanding of and agreement with the above plan. All question were answered.    CHANTEL Herman CNP on 1/13/2023 at 9:28 AM    Subjective Joonhyeok is a 27 year old who presents for the following health issues     Chief Complaint   Patient presents with     Gastrophageal Reflux     Off and on. Requesting RX.      Symptoms are intermittent and relatively mild. They include mild, dull epigastric pain. This is worsened by certain foods (like steak, spicy, heavy meals). Worse with stressful situations. Not necessarily correlated with a time of day. Has not done any OTC medications for his symptoms. No nausea or vomiting. No constipation or diarrhea. No blood in the stool. Some mild dysphagia, but more pressure. Able to eat and drink. All very consistent with his last visit.      Was seen in a clinic a number of years ago for a sensation of something stuck in his  "throat, chest pain and stomach pain. He was prescribed a medication for reflux and his symptoms improved immediately. Today, he reports the last year has been particularly stressful, including a move to [place], marriage, new jobs. He reports his symptoms historically have increased whenever he tries to 'get out of his comfort zone.'     History of Present Illness       Reason for visit:  Acid reflux  Symptom onset:  1-2 weeks ago  Symptom intensity:  Mild  Symptom progression:  Staying the same  Had these symptoms before:  Yes  Has tried/received treatment for these symptoms:  Yes  Previous treatment was successful:  Yes    He eats 0-1 servings of fruits and vegetables daily.He consumes 0 sweetened beverage(s) daily.He exercises with enough effort to increase his heart rate 30 to 60 minutes per day.  He exercises with enough effort to increase his heart rate 3 or less days per week.   He is taking medications regularly.     Review of Systems         Objective    /72 (BP Location: Left arm, Patient Position: Sitting, Cuff Size: Adult Regular)   Pulse 68   Temp 98.5  F (36.9  C) (Oral)   Resp 16   Ht 1.715 m (5' 7.5\")   Wt 68.6 kg (151 lb 4 oz)   BMI 23.34 kg/m    Body mass index is 23.34 kg/m .     Physical Exam  Vitals and nursing note reviewed.   Constitutional:       General: He is not in acute distress.     Appearance: Normal appearance. He is normal weight.   Cardiovascular:      Rate and Rhythm: Normal rate and regular rhythm.   Pulmonary:      Effort: Pulmonary effort is normal. No respiratory distress.   Abdominal:      General: Abdomen is flat. There is no distension.      Palpations: Abdomen is soft.      Tenderness: There is no abdominal tenderness. There is no guarding.   Skin:     General: Skin is warm.   Neurological:      General: No focal deficit present.      Mental Status: He is alert.   Psychiatric:         Mood and Affect: Mood normal.         Behavior: Behavior normal.         " Thought Content: Thought content normal.                    This note has been dictated using voice recognition software. Any grammatical or context distortions are unintentional and inherent to the software

## 2023-02-28 ENCOUNTER — OFFICE VISIT (OUTPATIENT)
Dept: FAMILY MEDICINE | Facility: CLINIC | Age: 28
End: 2023-02-28
Payer: COMMERCIAL

## 2023-02-28 VITALS
BODY MASS INDEX: 23.57 KG/M2 | RESPIRATION RATE: 16 BRPM | HEART RATE: 71 BPM | WEIGHT: 150.2 LBS | OXYGEN SATURATION: 99 % | DIASTOLIC BLOOD PRESSURE: 85 MMHG | SYSTOLIC BLOOD PRESSURE: 131 MMHG | HEIGHT: 67 IN

## 2023-02-28 DIAGNOSIS — F41.1 GENERALIZED ANXIETY DISORDER: Primary | ICD-10-CM

## 2023-02-28 PROCEDURE — 99214 OFFICE O/P EST MOD 30 MIN: CPT

## 2023-02-28 RX ORDER — PAROXETINE 10 MG/1
10 TABLET, FILM COATED ORAL EVERY MORNING
Qty: 90 TABLET | Refills: 0 | Status: SHIPPED | OUTPATIENT
Start: 2023-02-28 | End: 2023-03-16 | Stop reason: DRUGHIGH

## 2023-02-28 NOTE — ASSESSMENT & PLAN NOTE
Exacerbation of chronic condition. Discussed today that patient's nonspecific complaints such as intermittent mild joint/muscle pain, bothersome reflux appear relatively benign, however I believe that to uncontrolled anxiety are likely contributing to heightened perception of his symptoms.  Patient is in agreement today, and states that an almost identical situation happened last year, after which he sought care and was started on an SSRI with improvement.  He has been off of his Paxil for a number of months.  We have opted to restart his Paxil at 10 mg today.  Expected therapeutic effects and potential side effects discussed.  Review of his chart shows that he was maintained on 20 as his last dose, so discussed that he can double his 10 mg dosage after a couple weeks if he is not having improvement in his symptoms.  If, despite reinitiating his SSRI, he is still having health concerns, would pursue further work-up.  We discussed that he is due for an annual preventative exam, and encouraged him to schedule this with his primary care provider as he would like to continue care with Dr. Martínez.  At this time, they could follow-up on [facility] mental health and his reflux symptoms.  I am hesitant to attribute his mild, moving joint/muscle pains to the initiation of the famotidine, but it is listed as a potential side effect and I instructed him that he could reduce his dose/discontinue this medication and assess for improvement.  If he is unable to see Dr. Martínez, I encouraged him to follow-up with myself as needed.  Patient expressed understanding of and agreement with this plan.  All questions were answered.

## 2023-02-28 NOTE — PATIENT INSTRUCTIONS
Restart Paxil at 10mg. Let's try to stay at this dose, but if it is no controlling your anxiety after a month or so, you can double your dose to 20mg. If you end up doing this, send me a message and I will change the prescription.  I've also placed a referral for mental health counseling, which it looks like you have done in the past, if you find that helpful.  It is possible that the Pepcid is causing muscle/joint pains, although it happens in less than 1% of patients. You could try to decrease to once daily over the next week, and then stop and rely on TUMS for reflux symptoms.

## 2023-03-16 ENCOUNTER — OFFICE VISIT (OUTPATIENT)
Dept: FAMILY MEDICINE | Facility: CLINIC | Age: 28
End: 2023-03-16
Payer: COMMERCIAL

## 2023-03-16 VITALS
RESPIRATION RATE: 18 BRPM | TEMPERATURE: 97.5 F | WEIGHT: 146 LBS | SYSTOLIC BLOOD PRESSURE: 122 MMHG | BODY MASS INDEX: 22.13 KG/M2 | OXYGEN SATURATION: 97 % | HEIGHT: 68 IN | DIASTOLIC BLOOD PRESSURE: 66 MMHG | HEART RATE: 100 BPM

## 2023-03-16 DIAGNOSIS — Z00.00 ANNUAL PHYSICAL EXAM: Primary | ICD-10-CM

## 2023-03-16 DIAGNOSIS — F41.1 GENERALIZED ANXIETY DISORDER: ICD-10-CM

## 2023-03-16 DIAGNOSIS — K21.9 GASTROESOPHAGEAL REFLUX DISEASE WITHOUT ESOPHAGITIS: ICD-10-CM

## 2023-03-16 LAB
ALBUMIN SERPL BCG-MCNC: 4.9 G/DL (ref 3.5–5.2)
ALBUMIN UR-MCNC: 100 MG/DL
ALP SERPL-CCNC: 78 U/L (ref 40–129)
ALT SERPL W P-5'-P-CCNC: 31 U/L (ref 10–50)
ANION GAP SERPL CALCULATED.3IONS-SCNC: 13 MMOL/L (ref 7–15)
APPEARANCE UR: CLEAR
AST SERPL W P-5'-P-CCNC: 24 U/L (ref 10–50)
BACTERIA #/AREA URNS HPF: ABNORMAL /HPF
BILIRUB SERPL-MCNC: 0.6 MG/DL
BILIRUB UR QL STRIP: NEGATIVE
BUN SERPL-MCNC: 12.2 MG/DL (ref 6–20)
CALCIUM SERPL-MCNC: 10.2 MG/DL (ref 8.6–10)
CHLORIDE SERPL-SCNC: 105 MMOL/L (ref 98–107)
CHOLEST SERPL-MCNC: 229 MG/DL
COLOR UR AUTO: YELLOW
CREAT SERPL-MCNC: 0.97 MG/DL (ref 0.67–1.17)
DEPRECATED HCO3 PLAS-SCNC: 25 MMOL/L (ref 22–29)
ERYTHROCYTE [DISTWIDTH] IN BLOOD BY AUTOMATED COUNT: 11.6 % (ref 10–15)
GFR SERPL CREATININE-BSD FRML MDRD: >90 ML/MIN/1.73M2
GLUCOSE SERPL-MCNC: 102 MG/DL (ref 70–99)
GLUCOSE UR STRIP-MCNC: NEGATIVE MG/DL
HCT VFR BLD AUTO: 47 % (ref 40–53)
HDLC SERPL-MCNC: 66 MG/DL
HGB BLD-MCNC: 16.2 G/DL (ref 13.3–17.7)
HGB UR QL STRIP: ABNORMAL
KETONES UR STRIP-MCNC: NEGATIVE MG/DL
LDLC SERPL CALC-MCNC: 141 MG/DL
LEUKOCYTE ESTERASE UR QL STRIP: NEGATIVE
MCH RBC QN AUTO: 29.9 PG (ref 26.5–33)
MCHC RBC AUTO-ENTMCNC: 34.5 G/DL (ref 31.5–36.5)
MCV RBC AUTO: 87 FL (ref 78–100)
NITRATE UR QL: NEGATIVE
NONHDLC SERPL-MCNC: 163 MG/DL
PH UR STRIP: 7 [PH] (ref 5–8)
PLATELET # BLD AUTO: 177 10E3/UL (ref 150–450)
POTASSIUM SERPL-SCNC: 4.6 MMOL/L (ref 3.4–5.3)
PROT SERPL-MCNC: 7.6 G/DL (ref 6.4–8.3)
RBC # BLD AUTO: 5.42 10E6/UL (ref 4.4–5.9)
RBC #/AREA URNS AUTO: ABNORMAL /HPF
SODIUM SERPL-SCNC: 143 MMOL/L (ref 136–145)
SP GR UR STRIP: 1.02 (ref 1–1.03)
SPERM #/AREA URNS HPF: PRESENT /HPF
SQUAMOUS #/AREA URNS AUTO: ABNORMAL /LPF
TRIGL SERPL-MCNC: 111 MG/DL
UROBILINOGEN UR STRIP-ACNC: 0.2 E.U./DL
WBC # BLD AUTO: 5.3 10E3/UL (ref 4–11)
WBC #/AREA URNS AUTO: ABNORMAL /HPF

## 2023-03-16 PROCEDURE — 36415 COLL VENOUS BLD VENIPUNCTURE: CPT | Performed by: FAMILY MEDICINE

## 2023-03-16 PROCEDURE — 85027 COMPLETE CBC AUTOMATED: CPT | Performed by: FAMILY MEDICINE

## 2023-03-16 PROCEDURE — 99213 OFFICE O/P EST LOW 20 MIN: CPT | Mod: 25 | Performed by: FAMILY MEDICINE

## 2023-03-16 PROCEDURE — 80053 COMPREHEN METABOLIC PANEL: CPT | Performed by: FAMILY MEDICINE

## 2023-03-16 PROCEDURE — 80061 LIPID PANEL: CPT | Performed by: FAMILY MEDICINE

## 2023-03-16 PROCEDURE — 81001 URINALYSIS AUTO W/SCOPE: CPT | Performed by: FAMILY MEDICINE

## 2023-03-16 PROCEDURE — 99395 PREV VISIT EST AGE 18-39: CPT | Performed by: FAMILY MEDICINE

## 2023-03-16 RX ORDER — FAMOTIDINE 20 MG/1
20 TABLET, FILM COATED ORAL 2 TIMES DAILY
COMMUNITY

## 2023-03-16 RX ORDER — PAROXETINE 20 MG/1
20 TABLET, FILM COATED ORAL EVERY MORNING
Qty: 90 TABLET | Refills: 1 | Status: SHIPPED | OUTPATIENT
Start: 2023-03-16

## 2023-03-16 ASSESSMENT — ENCOUNTER SYMPTOMS
FEVER: 0
HEMATOCHEZIA: 0
DIARRHEA: 0
DIZZINESS: 0
ABDOMINAL PAIN: 0
CONSTIPATION: 0
ARTHRALGIAS: 0
NAUSEA: 0
SORE THROAT: 0
WEAKNESS: 0
SHORTNESS OF BREATH: 0
EYE PAIN: 0
HEADACHES: 0
DYSURIA: 0
HEMATURIA: 0
HEARTBURN: 0
PALPITATIONS: 0
JOINT SWELLING: 0
FREQUENCY: 0
CHILLS: 0
NERVOUS/ANXIOUS: 1
PARESTHESIAS: 0
COUGH: 0
MYALGIAS: 0

## 2023-03-16 ASSESSMENT — ANXIETY QUESTIONNAIRES
5. BEING SO RESTLESS THAT IT IS HARD TO SIT STILL: NOT AT ALL
GAD7 TOTAL SCORE: 9
6. BECOMING EASILY ANNOYED OR IRRITABLE: SEVERAL DAYS
4. TROUBLE RELAXING: NEARLY EVERY DAY
IF YOU CHECKED OFF ANY PROBLEMS ON THIS QUESTIONNAIRE, HOW DIFFICULT HAVE THESE PROBLEMS MADE IT FOR YOU TO DO YOUR WORK, TAKE CARE OF THINGS AT HOME, OR GET ALONG WITH OTHER PEOPLE: SOMEWHAT DIFFICULT
7. FEELING AFRAID AS IF SOMETHING AWFUL MIGHT HAPPEN: SEVERAL DAYS
7. FEELING AFRAID AS IF SOMETHING AWFUL MIGHT HAPPEN: SEVERAL DAYS
3. WORRYING TOO MUCH ABOUT DIFFERENT THINGS: SEVERAL DAYS
GAD7 TOTAL SCORE: 9
2. NOT BEING ABLE TO STOP OR CONTROL WORRYING: SEVERAL DAYS
1. FEELING NERVOUS, ANXIOUS, OR ON EDGE: MORE THAN HALF THE DAYS
GAD7 TOTAL SCORE: 9
8. IF YOU CHECKED OFF ANY PROBLEMS, HOW DIFFICULT HAVE THESE MADE IT FOR YOU TO DO YOUR WORK, TAKE CARE OF THINGS AT HOME, OR GET ALONG WITH OTHER PEOPLE?: SOMEWHAT DIFFICULT

## 2023-03-16 ASSESSMENT — PATIENT HEALTH QUESTIONNAIRE - PHQ9
SUM OF ALL RESPONSES TO PHQ QUESTIONS 1-9: 4
SUM OF ALL RESPONSES TO PHQ QUESTIONS 1-9: 4
10. IF YOU CHECKED OFF ANY PROBLEMS, HOW DIFFICULT HAVE THESE PROBLEMS MADE IT FOR YOU TO DO YOUR WORK, TAKE CARE OF THINGS AT HOME, OR GET ALONG WITH OTHER PEOPLE: SOMEWHAT DIFFICULT

## 2023-03-16 ASSESSMENT — PAIN SCALES - GENERAL: PAINLEVEL: MILD PAIN (2)

## 2023-03-16 NOTE — PROGRESS NOTES
SUBJECTIVE:   CC: Liza is an 27 year old who presents for preventative health visit.   Patient has been advised of split billing requirements and indicates understanding: Yes  Chief complaint: I want to talk about my medications; I want to talk about my stomach  History of present illness: And assessment: Liza presents here for follow-up and for a complete physical examination.  In the past we have treated him for acid reflux.  One of my partners followed him when I was out of town and placed him on famotidine and he is doing very well with that his reflux symptoms heartburn etc. have all but disappeared.  We also told him to plan on staying on this medication indefinitely as he is doing so well.  He has had a problem with anxiety and conversion reactions now he is having some muscle discomfort.  He was doing very well on paroxetine 20 mg in the past but he discontinued that on his own and gradually felt return of anxiety and more conversion reactions and therefore went back on Paxil 10 mg it does not seem to be working as well as the 20 mg.  We discussed this and his tendency to pay attention to bodily aches and pains a little more often than other people do and he therefore based on his success with paroxetine in the past of represcribed 20 mg and we advised him to stay on this and will follow him up on a 6-month basis he is anxious to check his blood and urine so we will do comprehensive profile lipid and urine studies today.  We did discuss the use of MyChart.  He did vacation in [place] this winter he is also going to [place] with his wife.  He is very happy that they purchased a real estate last year and they are settled in her new home at a reasonable rate that they can manage we had a nice discussion here and he is doing very well he looks well.  All medical questions asked were answered I personally reviewed family social history surgeries allergies problems list    Today's PHQ-2 Score:   PHQ-2 ( 1999  [employer]) 3/16/2023   Q1: Little interest or pleasure in doing things 1   Q2: Feeling down, depressed or hopeless 1   PHQ-2 Score 2   Q1: Little interest or pleasure in doing things Several days   Q2: Feeling down, depressed or hopeless Several days   PHQ-2 Score 2           Social History     Tobacco Use     Smoking status: Never     Smokeless tobacco: Never   Substance Use Topics     Alcohol use: Not on file         Alcohol Use 3/16/2023   Prescreen: >3 drinks/day or >7 drinks/week? No   No flowsheet data found.    Last PSA: No results found for: PSA    Reviewed orders with patient. Reviewed health maintenance and updated orders accordingly - Yes  Lab work is in process    Reviewed and updated as needed this visit by clinical staff    Reviewed and updated as needed this visit by Provider  Answers for HPI/ROS submitted by the patient on 3/16/2023  If you checked off any problems, how difficult have these problems made it for you to do your work, take care of things at home, or get along with other people?: Somewhat difficult  PHQ9 TOTAL SCORE: 4  WHIT 7 TOTAL SCORE: 9  Frequency of exercise:: 2-3 days/week  Getting at least 3 servings of Calcium per day:: Yes  Diet:: Other  Taking medications regularly:: Yes  Medication side effects:: Not applicable  Bi-annual eye exam:: Yes  Dental care twice a year:: NO  Sleep apnea or symptoms of sleep apnea:: None  Additional concerns today:: Yes  Duration of exercise:: 30-45 minutes          Review of Systems  CONSTITUTIONAL: NEGATIVE for fever, chills, change in weight  INTEGUMENTARY/SKIN: NEGATIVE for worrisome rashes, moles or lesions  EYES: NEGATIVE for vision changes or irritation  ENT: NEGATIVE for ear, mouth and throat problems  RESP: NEGATIVE for significant cough or SOB  CV: NEGATIVE for chest pain, palpitations or peripheral edema  GI: NEGATIVE for nausea, abdominal pain, heartburn, or change in bowel habits   male: negative for dysuria, hematuria, decreased urinary  "stream, erectile dysfunction, urethral discharge  MUSCULOSKELETAL: NEGATIVE for significant arthralgias or myalgia  NEURO: NEGATIVE for weakness, dizziness or paresthesias  PSYCHIATRIC: NEGATIVE for changes in mood or affect    OBJECTIVE:   /66   Pulse 100   Temp 97.5  F (36.4  C) (Temporal)   Resp 18   Ht 1.715 m (5' 7.5\")   Wt 66.2 kg (146 lb)   SpO2 97%   BMI 22.53 kg/m      Physical Exam  General Appearance:  Alert, cooperative, no distress  Head:  Normocephalic, no obvious abnormality  Ears: TM anatomy normal  Eyes:  PERRL, EOM's intact, conjunctiva and corneas clear  Nose:  Nares symmetrical, septum midline, mucosa pink, no sinus tenderness  Throat:  Lips, tongue, and mucosa are moist, pink, and intact  Neck:  Supple, symmetrical, trachea midline, no adenopathy; thyroid: no enlargement, symmetric,no tenderness/mass/nodules; no carotid bruit, no JVD  Back:  Symmetrical, no curvature, ROM normal, no CVA tenderness  Chest/Breast:  No mass or tenderness  Lungs:  Clear to auscultation bilaterally, respirations unlabored   Heart:  Normal PMI, regular rate & rhythm, S1 and S2 normal, no murmurs, rubs, or gallops  Abdomen:  Soft, non-tender, bowel sounds active all four quadrants, no mass, or organomegaly  Musculoskeletal:  Tone and strength strong and symmetrical, all extremities  Lymphatic:  No adenopathy  Skin/Hair/Nails:  Skin warm, dry, and intact, no rashes  Neurologic:  Alert and oriented x3, no cranial nerve deficits, normal strength and tone, gait steady  Extremities:  No edema.  Devin's sign negative.    Genitourinary: deferred  Pulses:  Equal bilaterally        ASSESSMENT/PLAN:       ICD-10-CM    1. Annual physical exam  Z00.00 PARoxetine (PAXIL) 20 MG tablet     CBC with platelets     Comprehensive metabolic panel     UA Macroscopic with reflex to Microscopic and Culture     Lipid panel reflex to direct LDL Fasting      2. Gastroesophageal reflux disease without esophagitis  K21.9       3. " Generalized anxiety disorder  F41.1           Patient has been advised of split billing requirements and indicates understanding: Yes      COUNSELING:   Reviewed preventive health counseling, as reflected in patient instructions       Regular exercise        He reports that he has never smoked. He has never used smokeless tobacco.        Montana Cobb MD  [facility]

## 2023-04-06 ENCOUNTER — VIRTUAL VISIT (OUTPATIENT)
Dept: PSYCHOLOGY | Facility: CLINIC | Age: 28
End: 2023-04-06
Payer: COMMERCIAL

## 2023-04-06 DIAGNOSIS — F41.1 GENERALIZED ANXIETY DISORDER: ICD-10-CM

## 2023-04-06 PROCEDURE — 90834 PSYTX W PT 45 MINUTES: CPT | Mod: VID | Performed by: COUNSELOR

## 2023-04-06 ASSESSMENT — ANXIETY QUESTIONNAIRES
1. FEELING NERVOUS, ANXIOUS, OR ON EDGE: SEVERAL DAYS
8. IF YOU CHECKED OFF ANY PROBLEMS, HOW DIFFICULT HAVE THESE MADE IT FOR YOU TO DO YOUR WORK, TAKE CARE OF THINGS AT HOME, OR GET ALONG WITH OTHER PEOPLE?: NOT DIFFICULT AT ALL
6. BECOMING EASILY ANNOYED OR IRRITABLE: NOT AT ALL
GAD7 TOTAL SCORE: 6
IF YOU CHECKED OFF ANY PROBLEMS ON THIS QUESTIONNAIRE, HOW DIFFICULT HAVE THESE PROBLEMS MADE IT FOR YOU TO DO YOUR WORK, TAKE CARE OF THINGS AT HOME, OR GET ALONG WITH OTHER PEOPLE: NOT DIFFICULT AT ALL
5. BEING SO RESTLESS THAT IT IS HARD TO SIT STILL: NOT AT ALL
2. NOT BEING ABLE TO STOP OR CONTROL WORRYING: MORE THAN HALF THE DAYS
7. FEELING AFRAID AS IF SOMETHING AWFUL MIGHT HAPPEN: NOT AT ALL
3. WORRYING TOO MUCH ABOUT DIFFERENT THINGS: SEVERAL DAYS
4. TROUBLE RELAXING: MORE THAN HALF THE DAYS
GAD7 TOTAL SCORE: 6
GAD7 TOTAL SCORE: 6
7. FEELING AFRAID AS IF SOMETHING AWFUL MIGHT HAPPEN: NOT AT ALL

## 2023-04-06 ASSESSMENT — COLUMBIA-SUICIDE SEVERITY RATING SCALE - C-SSRS
6. HAVE YOU EVER DONE ANYTHING, STARTED TO DO ANYTHING, OR PREPARED TO DO ANYTHING TO END YOUR LIFE?: NO
SUICIDE, SINCE LAST CONTACT: NO
TOTAL  NUMBER OF INTERRUPTED ATTEMPTS SINCE LAST CONTACT: NO
1. SINCE LAST CONTACT, HAVE YOU WISHED YOU WERE DEAD OR WISHED YOU COULD GO TO SLEEP AND NOT WAKE UP?: NO
TOTAL  NUMBER OF ABORTED OR SELF INTERRUPTED ATTEMPTS SINCE LAST CONTACT: NO
2. HAVE YOU ACTUALLY HAD ANY THOUGHTS OF KILLING YOURSELF?: NO
ATTEMPT SINCE LAST CONTACT: NO

## 2023-04-06 ASSESSMENT — PATIENT HEALTH QUESTIONNAIRE - PHQ9
SUM OF ALL RESPONSES TO PHQ QUESTIONS 1-9: 4
SUM OF ALL RESPONSES TO PHQ QUESTIONS 1-9: 4
10. IF YOU CHECKED OFF ANY PROBLEMS, HOW DIFFICULT HAVE THESE PROBLEMS MADE IT FOR YOU TO DO YOUR WORK, TAKE CARE OF THINGS AT HOME, OR GET ALONG WITH OTHER PEOPLE: NOT DIFFICULT AT ALL

## 2023-04-06 NOTE — PROGRESS NOTES
[facility]                                     Progress Note    Patient Name: Joonhyeok Ahn  Date: 2023           Service Type: Individual      Session Start Time: 8:00am  Session End Time: 8:50am     Session Length: 38-52 mins    Session #: 1    Attendees: Client attended alone    Service Modality:  Video Visit:      Provider verified identity through the following two step process.  Patient provided:  Patient  and Patient address    Telemedicine Visit: The patient's condition can be safely assessed and treated via synchronous audio and visual telemedicine encounter.      Reason for Telemedicine Visit: Services only offered telehealth    Originating Site (Patient Location): Patient's home    Distant Site (Provider Location): Provider Remote Setting- Home Office    Consent:  The patient/guardian has verbally consented to: the potential risks and benefits of telemedicine (video visit) versus in person care; bill my insurance or make self-payment for services provided; and responsibility for payment of non-covered services.     Patient would like the video invitation sent by:  My Chart    Mode of Communication:  Video Conference via [employer]    Distant Location (Provider):  Off-site    As the provider I attest to compliance with applicable laws and regulations related to telemedicine.    DATA  Interactive Complexity: No  Crisis: No        Progress Since Last Session (Related to Symptoms / Goals / Homework):   Symptoms: No change initial stages of treatment    Homework: Completed in session      Episode of Care Goals: No improvement - PREPARATION (Decided to change - considering how); Intervened by negotiating a change plan and determining options / strategies for behavior change, identifying triggers, exploring social supports, and working towards setting a date to begin behavior change     Current / Ongoing Stressors and Concerns:   Pt reports experiencing an increase in anxiety and  "noticing that he \"fixates on things\". He reports this occurs when experiencing health issues and work stress. He notices that this thought pattern has been \"fuel\" for professional progress. He reports the thought of \"I'm not good enough\" which leads to fixating on a next step and an increase in anxiety.     Treatment Objective(s) Addressed in This Session:   Objective #A (Client Action)  Patient will describe situations, thoughts, feelings, and actions associated with anxieties and worries, their impact on functioning and attempts to resolve them. Patient will do this 4 out of 7 days of the week.   Objective #B  Patient will use cognitive strategies identified in therapy to challenge negative thought patterns 90% of the time.  Objective #C  Patient will identify and use at least three coping skills and distraction and diversion activities to manage feelings of anxiety. Pt will use these skills at least three times per week.         Intervention:   Therapist met with patient to develop goals and interventions. Therapist utilized person centered therapy to identify patient's desires for therapy and pt stated a desire to work on feelings of anxiety. Therapist worked to build rapport with the patient and listened with empathy as the patient spoke in more depth regarding their experience. Therapist worked to normalize the pt's experience and work toward goal centered talk. Together pt and therapist worked on solidifying treatment goals and interventions for future therapy work.       Assessments completed prior to visit:  The following assessments were completed by patient for this visit:  PHQ9:       6/23/2022    11:00 AM 7/7/2022     9:22 AM 8/4/2022     9:51 AM 3/16/2023     8:34 AM 4/6/2023     7:47 AM   PHQ-9 SCORE   PHQ-9 Total Score MyChart  2 (Minimal depression)  4 (Minimal depression) 4 (Minimal depression)   PHQ-9 Total Score 4 2 5 4 4     GAD7:       6/23/2022    11:00 AM 7/7/2022     9:22 AM 3/16/2023     " "8:35 AM 4/6/2023     7:47 AM   WHIT-7 SCORE   Total Score  5 (mild anxiety) 9 (mild anxiety) 6 (mild anxiety)   Total Score 6 5 9 6         ASSESSMENT: Current Emotional / Mental Status (status of significant symptoms):   Risk status (Self / Other harm or suicidal ideation)   Patient denies current fears or concerns for personal safety.   Patient denies current or recent suicidal ideation or behaviors.   Patient denies current or recent homicidal ideation or behaviors.   Patient denies current or recent self injurious behavior or ideation.   Patient denies other safety concerns.   Patient reports there has been no change in risk factors since their last session.     Patient reports there has been no change in protective factors since their last session.     Recommended that patient call 911 or go to the local ED should there be a change in any of these risk factors.     Appearance:   Appropriate    Eye Contact:   Good    Psychomotor Behavior: Normal    Attitude:   Cooperative    Orientation:   All   Speech    Rate / Production: Normal     Volume:  Normal    Mood:    Normal   Affect:    Appropriate  Worrisome    Thought Content:  Clear    Thought Form:  Coherent  Logical    Insight:    Good      Medication Review:   No changes to current psychiatric medication(s)     Medication Compliance:   Yes     Changes in Health Issues:   None reported     Chemical Use Review:   Substance Use: Chemical use reviewed, no active concerns identified      Tobacco Use: No current tobacco use.      Diagnosis:  1. Generalized anxiety disorder        Collateral Reports Completed:   Not Applicable    PLAN: (Patient Tasks / Therapist Tasks / Other)  1) pt will increase awareness of anxious thoughts, 3 times per week  2) Pt will continue working through \"mind over mood\" wkbk  3) next session is scheduled for 3 weeks out        STEVIE Lemus [facility]                                                     "     ______________________________________________________________________    Individual Treatment Plan    Patient's Name: Joonhyeok Ahn  YOB: 1995    Date of Creation: 4/6/2023  Date Treatment Plan Last Reviewed/Revised: next review due on 7/6/23    DSM5 Diagnoses: 300.02 (F41.1) Generalized Anxiety Disorder  Psychosocial / Contextual Factors: 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    Anticipated number of session for this episode of care: 18-24  Anticipation frequency of session: Every other week  Anticipated Duration of each session: 38-52 minutes  Treatment plan will be reviewed in 90 days or when goals have been changed.       MeasurableTreatment Goal(s) related to diagnosis / functional impairment(s)  Goal- Anxiety: Patient will reduce overall frequency, intensity, and duration of the anxiety so that daily functioning is not impaired.     I will know I've met my goal when I have found an answer to midigate the anxiety that work.      Objective #A (Client Action)  Patient will describe situations, thoughts, feelings, and actions associated with anxieties and worries, their impact on functioning and attempts to resolve them. Patient will do this 4 out of 7 days of the week.   Intervention(s)  Therapist will provide psychoeducation, behavioral activation, and cognitive restructuring.  Status: New - Date: 4/6/2023    Objective #B  Patient will use cognitive strategies identified in therapy to challenge negative thought patterns 90% of the time.  Intervention(s)  Therapist will provide psychoeducation, behavioral activation, and cognitive restructuring.  Status: New - Date: 4/6/2023    Objective #C  Patient will identify and use at  least three coping skills and distraction and diversion activities to manage feelings of anxiety. Pt will use these skills at least three times per week.  Intervention(s)  Therapist will provide psychoeducation, behavioral activation, and cognitive restructuring.  Status: New - Date: 4/6/2023      Patient has reviewed and agreed to the above plan.      STEVIE Lemus, [facility]  April 6, 2023

## 2023-04-11 ENCOUNTER — OFFICE VISIT (OUTPATIENT)
Dept: FAMILY MEDICINE | Facility: CLINIC | Age: 28
End: 2023-04-11
Payer: COMMERCIAL

## 2023-04-11 VITALS
OXYGEN SATURATION: 98 % | HEART RATE: 73 BPM | RESPIRATION RATE: 7 BRPM | HEIGHT: 68 IN | TEMPERATURE: 97.7 F | WEIGHT: 150.4 LBS | BODY MASS INDEX: 22.79 KG/M2 | SYSTOLIC BLOOD PRESSURE: 107 MMHG | DIASTOLIC BLOOD PRESSURE: 70 MMHG

## 2023-04-11 DIAGNOSIS — F41.1 GENERALIZED ANXIETY DISORDER: ICD-10-CM

## 2023-04-11 DIAGNOSIS — K21.9 GASTROESOPHAGEAL REFLUX DISEASE WITHOUT ESOPHAGITIS: Primary | ICD-10-CM

## 2023-04-11 PROCEDURE — 99214 OFFICE O/P EST MOD 30 MIN: CPT | Performed by: FAMILY MEDICINE

## 2023-04-11 ASSESSMENT — PAIN SCALES - GENERAL: PAINLEVEL: MILD PAIN (2)

## 2023-04-12 NOTE — PROGRESS NOTES
"  Assessment & Plan     Gastroesophageal reflux disease without esophagitis  Patient will continue to use famotidine 20 mg once daily  Generalized anxiety:- Patient will follow along with psychology; also will  Continue to use paroxetine 20 mg once daily               Montana Cobb MD  [facility] Healthcare[facility]USIE De Jseus is a 28 year old, presenting for the following health issues:  RECHECK (FOLLOW-UP FOR HIS LAST VISIT  - AND TEST RESULTS )        4/11/2023     8:29 AM   Additional Questions   Roomed by ANIKET KENNEDY             Review of Systems   Constitutional, HEENT, cardiovascular, pulmonary, gi and gu systems are negative, except as otherwise noted.      Objective    /70 (BP Location: Left arm, Patient Position: Sitting, Cuff Size: Adult Regular)   Pulse 73   Temp 97.7  F (36.5  C) (Oral)   Resp (!) 7   Ht 1.72 m (5' 7.72\")   Wt 68.2 kg (150 lb 6.4 oz)   SpO2 98%   BMI 23.06 kg/m    Body mass index is 23.06 kg/m .  Physical Exam   GENERAL: healthy, alert and no distress  NECK: no adenopathy, no asymmetry, masses, or scars and thyroid normal to palpation  RESP: lungs clear to auscultation - no rales, rhonchi or wheezes  CV: regular rate and rhythm, normal S1 S2, no S3 or S4, no murmur, click or rub, no peripheral edema and peripheral pulses strong  ABDOMEN: soft, nontender, no hepatosplenomegaly, no masses and bowel sounds normal  MS: no gross musculoskeletal defects noted, no edema                    "

## 2023-04-12 NOTE — PROGRESS NOTES
Answers for HPI/ROS submitted by the patient on 4/11/2023  What is the reason for your visit today? : FOLLOW UP  How many servings of fruits and vegetables do you eat daily?: 2-3  On average, how many sweetened beverages do you drink each day (Examples: soda, juice, sweet tea, etc.  Do NOT count diet or artificially sweetened beverages)?: 0  How many minutes a day do you exercise enough to make your heart beat faster?: 30 to 60  How many days a week do you exercise enough to make your heart beat faster?: 3 or less  How many days per week do you miss taking your medication?: 0

## 2023-04-20 ENCOUNTER — VIRTUAL VISIT (OUTPATIENT)
Dept: PSYCHOLOGY | Facility: CLINIC | Age: 28
End: 2023-04-20
Payer: COMMERCIAL

## 2023-04-20 DIAGNOSIS — F41.1 GENERALIZED ANXIETY DISORDER: Primary | ICD-10-CM

## 2023-04-20 PROCEDURE — 90834 PSYTX W PT 45 MINUTES: CPT | Mod: VID | Performed by: COUNSELOR

## 2023-04-20 NOTE — PROGRESS NOTES
[facility]                                     Progress Note    Patient Name: Joonhyeok Ahn  Date: 2023           Service Type: Individual      Session Start Time: 8:00am  Session End Time: 8:50am     Session Length: 38-52 mins    Session #: 2    Attendees: Client attended alone    Service Modality:  Video Visit:      Provider verified identity through the following two step process.  Patient provided:  Patient  and Patient address    Telemedicine Visit: The patient's condition can be safely assessed and treated via synchronous audio and visual telemedicine encounter.      Reason for Telemedicine Visit: Services only offered telehealth    Originating Site (Patient Location): Patient's home    Distant Site (Provider Location): Provider Remote Setting- Home Office    Consent:  The patient/guardian has verbally consented to: the potential risks and benefits of telemedicine (video visit) versus in person care; bill my insurance or make self-payment for services provided; and responsibility for payment of non-covered services.     Patient would like the video invitation sent by:  My Chart    Mode of Communication:  Video Conference via Amwell    Distant Location (Provider):  Off-site    As the provider I attest to compliance with applicable laws and regulations related to telemedicine.    DATA  Interactive Complexity: No  Crisis: No        Progress Since Last Session (Related to Symptoms / Goals / Homework):   Symptoms: No change initial stages of treatment    Homework: Completed in session      Episode of Care Goals: Minimal progress - PREPARATION (Decided to change - considering how); Intervened by negotiating a change plan and determining options / strategies for behavior change, identifying triggers, exploring social supports, and working towards setting a date to begin behavior change     Current / Ongoing Stressors and Concerns:   Pt reports experiencing an increase in anxiety and  "noticing that he \"fixates on things\". He reports this occurs when experiencing health issues and work stress. He notices that this thought pattern has been \"fuel\" for professional progress. He reports the thought of \"I'm not good enough\" which leads to fixating on a next step and an increase in anxiety.     Treatment Objective(s) Addressed in This Session:   Objective #A (Client Action)  Patient will describe situations, thoughts, feelings, and actions associated with anxieties and worries, their impact on functioning and attempts to resolve them. Patient will do this 4 out of 7 days of the week.   Objective #B  Patient will use cognitive strategies identified in therapy to challenge negative thought patterns 90% of the time.  Objective #C  Patient will identify and use at least three coping skills and distraction and diversion activities to manage feelings of anxiety. Pt will use these skills at least three times per week.         Intervention:   Therapist met with patient to review goals and interventions. Therapist utilized reflective listening as patient gave brief reflection of the week. Patient processed feelings of anxiety.   Discussed mindfulness as being aware of what we are experiencing while we are experiencing it.  Contrasted this with avoidance and rumination.  Explored the role of mindfulness as an overall coping strategy for managing depression, anxiety, and strong emotions.  Explained concept of state of mind using SIFT (sensations, images, feelings, thoughts) pneumonic. Discussed mindfulness as a tool to intentionally shift our awareness and focus as needed.      Assessments completed prior to visit:  The following assessments were completed by patient for this visit:  PHQ9:       6/23/2022    11:00 AM 7/7/2022     9:22 AM 8/4/2022     9:51 AM 3/16/2023     8:34 AM 4/6/2023     7:47 AM   PHQ-9 SCORE   PHQ-9 Total Score Ruth  2 (Minimal depression)  4 (Minimal depression) 4 (Minimal depression) "   PHQ-9 Total Score 4 2 5 4 4     GAD7:       6/23/2022    11:00 AM 7/7/2022     9:22 AM 3/16/2023     8:35 AM 4/6/2023     7:47 AM   WHIT-7 SCORE   Total Score  5 (mild anxiety) 9 (mild anxiety) 6 (mild anxiety)   Total Score 6 5 9 6         ASSESSMENT: Current Emotional / Mental Status (status of significant symptoms):   Risk status (Self / Other harm or suicidal ideation)   Patient denies current fears or concerns for personal safety.   Patient denies current or recent suicidal ideation or behaviors.   Patient denies current or recent homicidal ideation or behaviors.   Patient denies current or recent self injurious behavior or ideation.   Patient denies other safety concerns.   Patient reports there has been no change in risk factors since their last session.     Patient reports there has been no change in protective factors since their last session.     Recommended that patient call 911 or go to the local ED should there be a change in any of these risk factors.     Appearance:   Appropriate    Eye Contact:   Good    Psychomotor Behavior: Normal    Attitude:   Cooperative    Orientation:   All   Speech    Rate / Production: Normal     Volume:  Normal    Mood:    Normal   Affect:    Appropriate  Worrisome    Thought Content:  Clear    Thought Form:  Coherent  Logical    Insight:    Good      Medication Review:   No changes to current psychiatric medication(s)     Medication Compliance:   Yes     Changes in Health Issues:   None reported     Chemical Use Review:   Substance Use: Chemical use reviewed, no active concerns identified      Tobacco Use: No current tobacco use.      Diagnosis:  1. Generalized anxiety disorder        Collateral Reports Completed:   Not Applicable    PLAN: (Patient Tasks / Therapist Tasks / Other)  1) pt will increase awareness of anxious thoughts, 3 times per week  2) Pt will practice mindfulness, daily  3) next session is scheduled for 2 weeks out        STEVIE Lemus 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    Anticipated number of session for this episode of care: 18-24  Anticipation frequency of session: Every other week  Anticipated Duration of each session: 38-52 minutes  Treatment plan will be reviewed in 90 days or when goals have been changed.       MeasurableTreatment Goal(s) related to diagnosis / functional impairment(s)  Goal- Anxiety: Patient will reduce overall frequency, intensity, and duration of the anxiety so that daily functioning is not impaired.     I will know I've met my goal when I have found an answer to midigate the anxiety that work.      Objective #A (Client Action)  Patient will describe situations, thoughts, feelings, and actions associated with anxieties and worries, their impact on functioning and attempts to resolve them. Patient will do this 4 out of 7 days of the week.   Intervention(s)  Therapist will provide psychoeducation, behavioral activation, and cognitive restructuring.  Status: New - Date: 4/6/2023    Objective #B  Patient will use cognitive strategies identified in therapy to challenge negative thought patterns 90% of the time.  Intervention(s)  Therapist will provide psychoeducation, behavioral activation, and cognitive restructuring.  Status: New - Date:  4/6/2023    Objective #C  Patient will identify and use at least three coping skills and distraction and diversion activities to manage feelings of anxiety. Pt will use these skills at least three times per week.  Intervention(s)  Therapist will provide psychoeducation, behavioral activation, and cognitive restructuring.  Status: New - Date: 4/6/2023      Patient has reviewed and agreed to the above plan.      STEVIE Lemus, [facility]  April 6, 2023

## 2023-05-04 ENCOUNTER — VIRTUAL VISIT (OUTPATIENT)
Dept: PSYCHOLOGY | Facility: CLINIC | Age: 28
End: 2023-05-04
Payer: COMMERCIAL

## 2023-05-04 DIAGNOSIS — F41.1 GENERALIZED ANXIETY DISORDER: Primary | ICD-10-CM

## 2023-05-04 PROCEDURE — 90834 PSYTX W PT 45 MINUTES: CPT | Mod: VID | Performed by: COUNSELOR

## 2023-05-04 NOTE — PROGRESS NOTES
[facility]                                     Progress Note    Patient Name: Joonhyeok Ahn  Date: 2023           Service Type: Individual      Session Start Time: 8:00am  Session End Time: 8:50am     Session Length: 38-52 mins    Session #: 3    Attendees: Client attended alone    Service Modality:  Video Visit:      Provider verified identity through the following two step process.  Patient provided:  Patient  and Patient address    Telemedicine Visit: The patient's condition can be safely assessed and treated via synchronous audio and visual telemedicine encounter.      Reason for Telemedicine Visit: Services only offered telehealth    Originating Site (Patient Location): Patient's home    Distant Site (Provider Location): Provider Remote Setting- Home Office    Consent:  The patient/guardian has verbally consented to: the potential risks and benefits of telemedicine (video visit) versus in person care; bill my insurance or make self-payment for services provided; and responsibility for payment of non-covered services.     Patient would like the video invitation sent by:  My Chart    Mode of Communication:  Video Conference via Amwell    Distant Location (Provider):  Off-site    As the provider I attest to compliance with applicable laws and regulations related to telemedicine.    DATA  Interactive Complexity: No  Crisis: No        Progress Since Last Session (Related to Symptoms / Goals / Homework):   Symptoms: No change initial stages of treatment    Homework: Completed in session      Episode of Care Goals: Minimal progress - PREPARATION (Decided to change - considering how); Intervened by negotiating a change plan and determining options / strategies for behavior change, identifying triggers, exploring social supports, and working towards setting a date to begin behavior change     Current / Ongoing Stressors and Concerns:   Pt reports experiencing an increase in anxiety and  "noticing that he \"fixates on things\". He reports this occurs when experiencing health issues and work stress. He notices that this thought pattern has been \"fuel\" for professional progress. He reports the thought of \"I'm not good enough\" which leads to fixating on a next step and an increase in anxiety.     Treatment Objective(s) Addressed in This Session:   Objective #A (Client Action)  Patient will describe situations, thoughts, feelings, and actions associated with anxieties and worries, their impact on functioning and attempts to resolve them. Patient will do this 4 out of 7 days of the week.   Objective #B  Patient will use cognitive strategies identified in therapy to challenge negative thought patterns 90% of the time.  Objective #C  Patient will identify and use at least three coping skills and distraction and diversion activities to manage feelings of anxiety. Pt will use these skills at least three times per week.         Intervention:   Therapist met with patient to review goals and interventions. Therapist utilized reflective listening as patient gave brief reflection of the week. Patient processed feelings of anxiety.   Discussed concept of cognitive distortions today in session.  Explored connection between automatic thinking and cognitive distortions.  Discussed common examples (e.g., catastrophizing, mindreading, dichotomous thinking) of cognitive distortions in session.  Handout provided.  Discussed connection between cognitive distortions and depression, anxiety, and stress.  Introduced concept of challenging cognitive distortions by asking \"is this reaction logical?\" , \"is this reaction helpful?\" , \"am I overreacting?\" , and \"how can I respond to make this situation better?\"      Assessments completed prior to visit:  The following assessments were completed by patient for this visit:  PHQ9:       6/23/2022    11:00 AM 7/7/2022     9:22 AM 8/4/2022     9:51 AM 3/16/2023     8:34 AM 4/6/2023     " 7:47 AM   PHQ-9 SCORE   PHQ-9 Total Score MyChart  2 (Minimal depression)  4 (Minimal depression) 4 (Minimal depression)   PHQ-9 Total Score 4 2 5 4 4     GAD7:       6/23/2022    11:00 AM 7/7/2022     9:22 AM 3/16/2023     8:35 AM 4/6/2023     7:47 AM   WHIT-7 SCORE   Total Score  5 (mild anxiety) 9 (mild anxiety) 6 (mild anxiety)   Total Score 6 5 9 6         ASSESSMENT: Current Emotional / Mental Status (status of significant symptoms):   Risk status (Self / Other harm or suicidal ideation)   Patient denies current fears or concerns for personal safety.   Patient denies current or recent suicidal ideation or behaviors.   Patient denies current or recent homicidal ideation or behaviors.   Patient denies current or recent self injurious behavior or ideation.   Patient denies other safety concerns.   Patient reports there has been no change in risk factors since their last session.     Patient reports there has been no change in protective factors since their last session.     Recommended that patient call 911 or go to the local ED should there be a change in any of these risk factors.     Appearance:   Appropriate    Eye Contact:   Good    Psychomotor Behavior: Normal    Attitude:   Cooperative    Orientation:   All   Speech    Rate / Production: Normal     Volume:  Normal    Mood:    Normal   Affect:    Appropriate  Worrisome    Thought Content:  Clear    Thought Form:  Coherent  Logical    Insight:    Good      Medication Review:   No changes to current psychiatric medication(s)     Medication Compliance:   Yes     Changes in Health Issues:   None reported     Chemical Use Review:   Substance Use: Chemical use reviewed, no active concerns identified      Tobacco Use: No current tobacco use.      Diagnosis:  1. Generalized anxiety disorder        Collateral Reports Completed:   Not Applicable    PLAN: (Patient Tasks / Therapist Tasks / Other)  1) pt will increase awareness of anxious thoughts, 3 times per week  2)  Pt will read through cognitive distortion worksheet  3) next session is scheduled for 2 weeks out        STEVIE Lemus, [facility]                                                         ______________________________________________________________________    Individual Treatment Plan    Patient's Name: Joonhyeok Ahn  YOB: 1995    Date of Creation: 4/6/2023  Date Treatment Plan Last Reviewed/Revised: next review due on 7/6/23    DSM5 Diagnoses: 300.02 (F41.1) Generalized Anxiety Disorder  Psychosocial / Contextual Factors: 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    Anticipated number of session for this episode of care: 18-24  Anticipation frequency of session: Every other week  Anticipated Duration of each session: 38-52 minutes  Treatment plan will be reviewed in 90 days or when goals have been changed.       MeasurableTreatment Goal(s) related to diagnosis / functional impairment(s)  Goal- Anxiety: Patient will reduce overall frequency, intensity, and duration of the anxiety so that daily functioning is not impaired.     I will know I've met my goal when I have found an answer to midigate the anxiety that works.      Objective #A (Client Action)  Patient will describe situations, thoughts, feelings, and actions associated with anxieties and worries, their impact on functioning and attempts to resolve them. Patient will do this 4 out of 7 days of the week.   Intervention(s)  Therapist will provide psychoeducation, behavioral activation, and cognitive restructuring.  Status: New - Date: 4/6/2023    Objective #B  Patient will use cognitive strategies identified in therapy to challenge negative thought patterns 90% of the  time.  Intervention(s)  Therapist will provide psychoeducation, behavioral activation, and cognitive restructuring.  Status: New - Date: 4/6/2023    Objective #C  Patient will identify and use at least three coping skills and distraction and diversion activities to manage feelings of anxiety. Pt will use these skills at least three times per week.  Intervention(s)  Therapist will provide psychoeducation, behavioral activation, and cognitive restructuring.  Status: New - Date: 4/6/2023      Patient has reviewed and agreed to the above plan.      STEVIE Lemus, [facility]  April 6, 2023

## 2023-05-17 ENCOUNTER — VIRTUAL VISIT (OUTPATIENT)
Dept: PSYCHOLOGY | Facility: CLINIC | Age: 28
End: 2023-05-17
Payer: COMMERCIAL

## 2023-05-17 DIAGNOSIS — F41.1 GENERALIZED ANXIETY DISORDER: Primary | ICD-10-CM

## 2023-05-17 PROCEDURE — 90834 PSYTX W PT 45 MINUTES: CPT | Mod: VID | Performed by: COUNSELOR

## 2023-05-17 NOTE — PROGRESS NOTES
[facility]                                     Progress Note    Patient Name: Joonhyeok Ahn  Date: 2023           Service Type: Individual      Session Start Time: 8:00am  Session End Time: 8:50am     Session Length: 38-52 mins    Session #: 3    Attendees: Client attended alone    Service Modality:  Video Visit:      Provider verified identity through the following two step process.  Patient provided:  Patient  and Patient address    Telemedicine Visit: The patient's condition can be safely assessed and treated via synchronous audio and visual telemedicine encounter.      Reason for Telemedicine Visit: Services only offered telehealth    Originating Site (Patient Location): Patient's home    Distant Site (Provider Location): Provider Remote Setting- Home Office    Consent:  The patient/guardian has verbally consented to: the potential risks and benefits of telemedicine (video visit) versus in person care; bill my insurance or make self-payment for services provided; and responsibility for payment of non-covered services.     Patient would like the video invitation sent by:  My Chart    Mode of Communication:  Video Conference via Amwell    Distant Location (Provider):  Off-site    As the provider I attest to compliance with applicable laws and regulations related to telemedicine.    DATA  Interactive Complexity: No  Crisis: No        Progress Since Last Session (Related to Symptoms / Goals / Homework):   Symptoms: No change initial stages of treatment    Homework: Completed in session      Episode of Care Goals: Minimal progress - PREPARATION (Decided to change - considering how); Intervened by negotiating a change plan and determining options / strategies for behavior change, identifying triggers, exploring social supports, and working towards setting a date to begin behavior change     Current / Ongoing Stressors and Concerns:   Pt reports experiencing an increase in anxiety and  "noticing that he \"fixates on things\". He reports this occurs when experiencing health issues and work stress. He notices that this thought pattern has been \"fuel\" for professional progress. He reports the thought of \"I'm not good enough\" which leads to fixating on a next step and an increase in anxiety.     Treatment Objective(s) Addressed in This Session:   Objective #A (Client Action)  Patient will describe situations, thoughts, feelings, and actions associated with anxieties and worries, their impact on functioning and attempts to resolve them. Patient will do this 4 out of 7 days of the week.   Objective #B  Patient will use cognitive strategies identified in therapy to challenge negative thought patterns 90% of the time.  Objective #C  Patient will identify and use at least three coping skills and distraction and diversion activities to manage feelings of anxiety. Pt will use these skills at least three times per week.         Intervention:   Therapist met with patient to review goals and interventions. Therapist utilized reflective listening as patient gave brief reflection of the week. Patient processed feelings of anxiety.   Utilized CBT to discuss cognitive distortions that lead to feelings of overwhelm and anxiety. Reviewed Adilene Theodore's work on values and the importance they play in making grounded life decisions and decreasing disturbing emotions.       Assessments completed prior to visit:  The following assessments were completed by patient for this visit:  PHQ9:       6/23/2022    11:00 AM 7/7/2022     9:22 AM 8/4/2022     9:51 AM 3/16/2023     8:34 AM 4/6/2023     7:47 AM   PHQ-9 SCORE   PHQ-9 Total Score MyChart  2 (Minimal depression)  4 (Minimal depression) 4 (Minimal depression)   PHQ-9 Total Score 4 2 5 4 4     GAD7:       6/23/2022    11:00 AM 7/7/2022     9:22 AM 3/16/2023     8:35 AM 4/6/2023     7:47 AM   WHIT-7 SCORE   Total Score  5 (mild anxiety) 9 (mild anxiety) 6 (mild anxiety)   Total " Score 6 5 9 6         ASSESSMENT: Current Emotional / Mental Status (status of significant symptoms):   Risk status (Self / Other harm or suicidal ideation)   Patient denies current fears or concerns for personal safety.   Patient denies current or recent suicidal ideation or behaviors.   Patient denies current or recent homicidal ideation or behaviors.   Patient denies current or recent self injurious behavior or ideation.   Patient denies other safety concerns.   Patient reports there has been no change in risk factors since their last session.     Patient reports there has been no change in protective factors since their last session.     Recommended that patient call 911 or go to the local ED should there be a change in any of these risk factors.     Appearance:   Appropriate    Eye Contact:   Good    Psychomotor Behavior: Normal    Attitude:   Cooperative    Orientation:   All   Speech    Rate / Production: Normal     Volume:  Normal    Mood:    Normal   Affect:    Appropriate    Thought Content:  Clear    Thought Form:  Coherent  Logical    Insight:    Good      Medication Review:   No changes to current psychiatric medication(s)     Medication Compliance:   Yes     Changes in Health Issues:   None reported     Chemical Use Review:   Substance Use: Chemical use reviewed, no active concerns identified      Tobacco Use: No current tobacco use.      Diagnosis:  1. Generalized anxiety disorder        Collateral Reports Completed:   Not Applicable    PLAN: (Patient Tasks / Therapist Tasks / Other)  1) pt will increase awareness of anxious thoughts, 3 times per week  2) Pt will complete values activity  3) next session is scheduled for 1 month out        STEVIE Lemus [facility]                                                         ______________________________________________________________________    Individual Treatment Plan    Patient's Name: Joonhyeok Ahn  YOB: 1995    Date of Creation:  4/6/2023  Date Treatment Plan Last Reviewed/Revised: next review due on 7/6/23    DSM5 Diagnoses: 300.02 (F41.1) Generalized Anxiety Disorder  Psychosocial / Contextual Factors: 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    Anticipated number of session for this episode of care: 18-24  Anticipation frequency of session: Every other week  Anticipated Duration of each session: 38-52 minutes  Treatment plan will be reviewed in 90 days or when goals have been changed.       MeasurableTreatment Goal(s) related to diagnosis / functional impairment(s)  Goal- Anxiety: Patient will reduce overall frequency, intensity, and duration of the anxiety so that daily functioning is not impaired.     I will know I've met my goal when I have found an answer to midigate the anxiety that works.      Objective #A (Client Action)  Patient will describe situations, thoughts, feelings, and actions associated with anxieties and worries, their impact on functioning and attempts to resolve them. Patient will do this 4 out of 7 days of the week.   Intervention(s)  Therapist will provide psychoeducation, behavioral activation, and cognitive restructuring.  Status: New - Date: 4/6/2023    Objective #B  Patient will use cognitive strategies identified in therapy to challenge negative thought patterns 90% of the time.  Intervention(s)  Therapist will provide psychoeducation, behavioral activation, and cognitive restructuring.  Status: New - Date: 4/6/2023    Objective #C  Patient will identify and use at least three coping skills and distraction and diversion activities to manage feelings of anxiety. Pt will use these skills at least three times per  week.  Intervention(s)  Therapist will provide psychoeducation, behavioral activation, and cognitive restructuring.  Status: New - Date: 4/6/2023      Patient has reviewed and agreed to the above plan.      STEVIE Lemus, [facility]  April 6, 2023

## 2023-06-21 ENCOUNTER — VIRTUAL VISIT (OUTPATIENT)
Dept: PSYCHOLOGY | Facility: CLINIC | Age: 28
End: 2023-06-21
Payer: COMMERCIAL

## 2023-06-21 DIAGNOSIS — F41.1 GENERALIZED ANXIETY DISORDER: Primary | ICD-10-CM

## 2023-06-21 PROCEDURE — 90834 PSYTX W PT 45 MINUTES: CPT | Mod: VID | Performed by: COUNSELOR

## 2023-06-21 NOTE — PROGRESS NOTES
"[facility]                                     Progress Note    Patient Name: Joonhyeok Ahn  Date: 2023           Service Type: Individual      Session Start Time: 9:00am  Session End Time: 9:50am     Session Length: 38-52 mins    Session #: 4    Attendees: Client attended alone    Service Modality:  Video Visit:      Provider verified identity through the following two step process.  Patient provided:  Patient  and Patient address    Telemedicine Visit: The patient's condition can be safely assessed and treated via synchronous audio and visual telemedicine encounter.      Reason for Telemedicine Visit: Services only offered telehealth    Originating Site (Patient Location): Patient's home    Distant Site (Provider Location): Provider Remote Setting- Home Office    Consent:  The patient/guardian has verbally consented to: the potential risks and benefits of telemedicine (video visit) versus in person care; bill my insurance or make self-payment for services provided; and responsibility for payment of non-covered services.     Patient would like the video invitation sent by:  My Chart    Mode of Communication:  Video Conference via Amwell    Distant Location (Provider):  Off-site    As the provider I attest to compliance with applicable laws and regulations related to telemedicine.    DATA  Interactive Complexity: No  Crisis: No        Progress Since Last Session (Related to Symptoms / Goals / Homework):   Symptoms: Improving pt is reporting a decrease in symptoms    Homework: Completed in session      Episode of Care Goals: Satisfactory progress - ACTION (Actively working towards change); Intervened by reinforcing change plan / affirming steps taken     Current / Ongoing Stressors and Concerns:   Pt reports experiencing an increase in anxiety and noticing that he \"fixates on things\". He reports this occurs when experiencing health issues and work stress. He notices that this thought " "pattern has been \"fuel\" for professional progress. He reports the thought of \"I'm not good enough\" which leads to fixating on a next step and an increase in anxiety.     Treatment Objective(s) Addressed in This Session:   Objective #A (Client Action)  Patient will describe situations, thoughts, feelings, and actions associated with anxieties and worries, their impact on functioning and attempts to resolve them. Patient will do this 4 out of 7 days of the week.   Objective #B  Patient will use cognitive strategies identified in therapy to challenge negative thought patterns 90% of the time.  -discussed thought patterns  Objective #C  Patient will identify and use at least three coping skills and distraction and diversion activities to manage feelings of anxiety. Pt will use these skills at least three times per week.  -discussed coping skills         Intervention:   Therapist met with patient to review goals and interventions. Therapist utilized reflective listening as patient gave brief reflection of the week. Patient processed feelings of anxiety.   Utilized CBT to discuss cognitive distortions that lead to feelings of overwhelm and anxiety. Reviewed pt's use of identifying core values has shaped his focus and behaviors. Briefly discussed the difference between gilt and shame and the importance of identifying the thought process that leads to shame.     Assessments completed prior to visit:  The following assessments were completed by patient for this visit:  PHQ9:       6/23/2022    11:00 AM 7/7/2022     9:22 AM 8/4/2022     9:51 AM 3/16/2023     8:34 AM 4/6/2023     7:47 AM   PHQ-9 SCORE   PHQ-9 Total Score MyChart  2 (Minimal depression)  4 (Minimal depression) 4 (Minimal depression)   PHQ-9 Total Score 4 2 5 4 4     GAD7:       6/23/2022    11:00 AM 7/7/2022     9:22 AM 3/16/2023     8:35 AM 4/6/2023     7:47 AM   WHIT-7 SCORE   Total Score  5 (mild anxiety) 9 (mild anxiety) 6 (mild anxiety)   Total Score 6 5 9 " 6         ASSESSMENT: Current Emotional / Mental Status (status of significant symptoms):   Risk status (Self / Other harm or suicidal ideation)   Patient denies current fears or concerns for personal safety.   Patient denies current or recent suicidal ideation or behaviors.   Patient denies current or recent homicidal ideation or behaviors.   Patient denies current or recent self injurious behavior or ideation.   Patient denies other safety concerns.   Patient reports there has been no change in risk factors since their last session.     Patient reports there has been no change in protective factors since their last session.     Recommended that patient call 911 or go to the local ED should there be a change in any of these risk factors.     Appearance:   Appropriate    Eye Contact:   Good    Psychomotor Behavior: Normal    Attitude:   Cooperative    Orientation:   All   Speech    Rate / Production: Normal     Volume:  Normal    Mood:    Normal   Affect:    Appropriate    Thought Content:  Clear    Thought Form:  Coherent  Logical    Insight:    Good      Medication Review:   No changes to current psychiatric medication(s)     Medication Compliance:   Yes     Changes in Health Issues:   None reported     Chemical Use Review:   Substance Use: Chemical use reviewed, no active concerns identified      Tobacco Use: No current tobacco use.      Diagnosis:  1. Generalized anxiety disorder        Collateral Reports Completed:   Not Applicable    PLAN: (Patient Tasks / Therapist Tasks / Other)  1) pt will increase awareness of anxious thoughts, 3 times per week  2) Pt will research information on shame and increase awareness   3) next session is scheduled for 2 weeks out        STEVIE Lemus [facility]                                                         ______________________________________________________________________    Individual Treatment Plan    Patient's Name: Joonhyeok Ahn  YOB: 1995    Date of  Creation: 4/6/2023  Date Treatment Plan Last Reviewed/Revised: next review due on 7/6/23    DSM5 Diagnoses: 300.02 (F41.1) Generalized Anxiety Disorder  Psychosocial / Contextual Factors: 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    Anticipated number of session for this episode of care: 18-24  Anticipation frequency of session: Every other week  Anticipated Duration of each session: 38-52 minutes  Treatment plan will be reviewed in 90 days or when goals have been changed.       MeasurableTreatment Goal(s) related to diagnosis / functional impairment(s)  Goal- Anxiety: Patient will reduce overall frequency, intensity, and duration of the anxiety so that daily functioning is not impaired.     I will know I've met my goal when I have found an answer to midigate the anxiety that works.      Objective #A (Client Action)  Patient will describe situations, thoughts, feelings, and actions associated with anxieties and worries, their impact on functioning and attempts to resolve them. Patient will do this 4 out of 7 days of the week.   Intervention(s)  Therapist will provide psychoeducation, behavioral activation, and cognitive restructuring.  Status: New - Date: 4/6/2023    Objective #B  Patient will use cognitive strategies identified in therapy to challenge negative thought patterns 90% of the time.  Intervention(s)  Therapist will provide psychoeducation, behavioral activation, and cognitive restructuring.  Status: New - Date: 4/6/2023    Objective #C  Patient will identify and use at least three coping skills and distraction and diversion activities to manage feelings of anxiety. Pt will use these skills at least three times per  week.  Intervention(s)  Therapist will provide psychoeducation, behavioral activation, and cognitive restructuring.  Status: New - Date: 4/6/2023      Patient has reviewed and agreed to the above plan.      STEVIE Lemus, [facility]  April 6, 2023

## 2023-07-06 ENCOUNTER — VIRTUAL VISIT (OUTPATIENT)
Dept: PSYCHOLOGY | Facility: CLINIC | Age: 28
End: 2023-07-06

## 2023-07-06 DIAGNOSIS — F41.1 GENERALIZED ANXIETY DISORDER: Primary | ICD-10-CM

## 2023-07-06 PROCEDURE — 90834 PSYTX W PT 45 MINUTES: CPT | Mod: VID | Performed by: COUNSELOR

## 2023-07-06 ASSESSMENT — COLUMBIA-SUICIDE SEVERITY RATING SCALE - C-SSRS
2. HAVE YOU ACTUALLY HAD ANY THOUGHTS OF KILLING YOURSELF?: NO
1. SINCE LAST CONTACT, HAVE YOU WISHED YOU WERE DEAD OR WISHED YOU COULD GO TO SLEEP AND NOT WAKE UP?: NO
SUICIDE, SINCE LAST CONTACT: NO
6. HAVE YOU EVER DONE ANYTHING, STARTED TO DO ANYTHING, OR PREPARED TO DO ANYTHING TO END YOUR LIFE?: NO
TOTAL  NUMBER OF ABORTED OR SELF INTERRUPTED ATTEMPTS SINCE LAST CONTACT: NO
ATTEMPT SINCE LAST CONTACT: NO
TOTAL  NUMBER OF INTERRUPTED ATTEMPTS SINCE LAST CONTACT: NO

## 2023-07-06 NOTE — PROGRESS NOTES
"[facility]                                     Progress Note    Patient Name: Joonhyeok Ahn  Date: 2023           Service Type: Individual      Session Start Time: 8:00am  Session End Time: 8:50am     Session Length: 38-52 mins    Session #: 5    Attendees: Client attended alone    Service Modality:  Video Visit:      Provider verified identity through the following two step process.  Patient provided:  Patient  and Patient address    Telemedicine Visit: The patient's condition can be safely assessed and treated via synchronous audio and visual telemedicine encounter.      Reason for Telemedicine Visit: Services only offered telehealth    Originating Site (Patient Location): Patient's home    Distant Site (Provider Location): Provider Remote Setting- Home Office    Consent:  The patient/guardian has verbally consented to: the potential risks and benefits of telemedicine (video visit) versus in person care; bill my insurance or make self-payment for services provided; and responsibility for payment of non-covered services.     Patient would like the video invitation sent by:  My Chart    Mode of Communication:  Video Conference via Amwell    Distant Location (Provider):  Off-site    As the provider I attest to compliance with applicable laws and regulations related to telemedicine.    DATA  Interactive Complexity: No  Crisis: No        Progress Since Last Session (Related to Symptoms / Goals / Homework):   Symptoms: Improving pt is reporting a decrease in symptoms    Homework: Completed in session      Episode of Care Goals: Satisfactory progress - MAINTENANCE (Working to maintain change, with risk of relapse); Intervened by continuing to positively reinforce healthy behavior choice      Current / Ongoing Stressors and Concerns:    Pt reports a decrease in anxiety and depression since last session and an ability to recognize the irrational core belief of \"I'm not good enough\" and how " "it tends to shape his thought patterns and emotions.      Treatment Objective(s) Addressed in This Session:   Objective #A (Client Action)  Patient will describe situations, thoughts, feelings, and actions associated with anxieties and worries, their impact on functioning and attempts to resolve them. Patient will do this 4 out of 7 days of the week.   - pt spoke about increased awareness  Objective #B  Patient will use cognitive strategies identified in therapy to challenge negative thought patterns 90% of the time.  -discussed thought patterns  - discussed ability to reframe thoughts  Objective #C  Patient will identify and use at least three coping skills and distraction and diversion activities to manage feelings of anxiety. Pt will use these skills at least three times per week.  -discussed coping skills         Intervention:   Therapist met with patient to review goals and interventions. Therapist utilized reflective listening as patient gave brief reflection of the week. Patient processed feelings of anxiety.   Utilized CBT to discuss irrational core belief of \"I\"m not good enough\" and how the patient has practiced awareness and an ability to reframe cognitive distortions that are often rooted in this belief. Discussed the role of shame in this process and generational patterns.     Assessments completed prior to visit:  The following assessments were completed by patient for this visit:  PHQ9:       6/23/2022    11:00 AM 7/7/2022     9:22 AM 8/4/2022     9:51 AM 3/16/2023     8:34 AM 4/6/2023     7:47 AM   PHQ-9 SCORE   PHQ-9 Total Score MyChart  2 (Minimal depression)  4 (Minimal depression) 4 (Minimal depression)   PHQ-9 Total Score 4 2 5 4 4     GAD7:       6/23/2022    11:00 AM 7/7/2022     9:22 AM 3/16/2023     8:35 AM 4/6/2023     7:47 AM   WHIT-7 SCORE   Total Score  5 (mild anxiety) 9 (mild anxiety) 6 (mild anxiety)   Total Score 6 5 9 6         ASSESSMENT: Current Emotional / Mental Status (status of " "significant symptoms):   Risk status (Self / Other harm or suicidal ideation)   Patient denies current fears or concerns for personal safety.   Patient denies current or recent suicidal ideation or behaviors.   Patient denies current or recent homicidal ideation or behaviors.   Patient denies current or recent self injurious behavior or ideation.   Patient denies other safety concerns.   Patient reports there has been no change in risk factors since their last session.     Patient reports there has been no change in protective factors since their last session.     Recommended that patient call 911 or go to the local ED should there be a change in any of these risk factors.     Appearance:   Appropriate    Eye Contact:   Good    Psychomotor Behavior: Normal    Attitude:   Cooperative    Orientation:   All   Speech    Rate / Production: Normal     Volume:  Normal    Mood:    Normal   Affect:    Appropriate    Thought Content:  Clear    Thought Form:  Coherent  Logical    Insight:    Good      Medication Review:   No changes to current psychiatric medication(s)     Medication Compliance:   Yes     Changes in Health Issues:   None reported     Chemical Use Review:   Substance Use: Chemical use reviewed, no active concerns identified      Tobacco Use: No current tobacco use.      Diagnosis:  1. Generalized anxiety disorder        Collateral Reports Completed:   Not Applicable    PLAN: (Patient Tasks / Therapist Tasks / Other)  1) pt will increase awareness of anxious thoughts, 3 times per week  2) Pt will read \"Self Compassion\"  3) next session is scheduled for 1 month out        STEVIE Lemus [facility]                                                         ______________________________________________________________________    Individual Treatment Plan    Patient's Name: Joonhyeok Ahn  YOB: 1995    Date of Creation: 4/6/2023  Date Treatment Plan Last Reviewed/Revised: 7/6/2023; next review due " 10/6/23    DSM5 Diagnoses: 300.02 (F41.1) Generalized Anxiety Disorder  Psychosocial / Contextual Factors: 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    Anticipated number of session for this episode of care: 18-24  Anticipation frequency of session: Every other week  Anticipated Duration of each session: 38-52 minutes  Treatment plan will be reviewed in 90 days or when goals have been changed.       MeasurableTreatment Goal(s) related to diagnosis / functional impairment(s)  Goal- Anxiety: Patient will reduce overall frequency, intensity, and duration of the anxiety so that daily functioning is not impaired.     I will know I've met my goal when I have found an answer to midigate the anxiety that works.      Objective #A (Client Action)  Patient will describe situations, thoughts, feelings, and actions associated with anxieties and worries, their impact on functioning and attempts to resolve them. Patient will do this 4 out of 7 days of the week.   Intervention(s)  Therapist will provide psychoeducation, behavioral activation, and cognitive restructuring.  Status: Continued - Date: 4/6/2023; 7/6/2023    Objective #B  Patient will use cognitive strategies identified in therapy to challenge negative thought patterns 90% of the time.  Intervention(s)  Therapist will provide psychoeducation, behavioral activation, and cognitive restructuring.  Status: Continued - Date: 4/6/2023; 7/6/2023    Objective #C  Patient will identify and use at least three coping skills and distraction and diversion activities to manage feelings of anxiety. Pt will use these skills at least three times per week.  Intervention(s)  Therapist will provide psychoeducation,  behavioral activation, and cognitive restructuring.  Status: Continued - Date: 4/6/2023; 7/6/2023      Patient has reviewed and agreed to the above plan.      STEVIE Lemus, [facility]  April 6, 2023; 7/6/2023

## 2023-09-16 NOTE — PROGRESS NOTES
Problem List Items Addressed This Visit        Behavioral    Generalized anxiety disorder - Primary     Exacerbation of chronic condition. Discussed today that patient's nonspecific complaints such as intermittent mild joint/muscle pain, bothersome reflux appear relatively benign, however I believe that to uncontrolled anxiety are likely contributing to heightened perception of his symptoms.  Patient is in agreement today, and states that an almost identical situation happened last year, after which he sought care and was started on an SSRI with improvement.  He has been off of his Paxil for a number of months.  We have opted to restart his Paxil at 10 mg today.  Expected therapeutic effects and potential side effects discussed.  Review of his chart shows that he was maintained on 20 as his last dose, so discussed that he can double his 10 mg dosage after a couple weeks if he is not having improvement in his symptoms.  If, despite reinitiating his SSRI, he is still having health concerns, would pursue further work-up.  We discussed that he is due for an annual preventative exam, and encouraged him to schedule this with his primary care provider as he would like to continue care with Dr. Martínez.  At this time, they could follow-up on [facility] and his reflux symptoms.  I am hesitant to attribute his mild, moving joint/muscle pains to the initiation of the famotidine, but it is listed as a potential side effect and I instructed him that he could reduce his dose/discontinue this medication and assess for improvement.  If he is unable to see Dr. Martínez, I encouraged him to follow-up with myself as needed.  Patient expressed understanding of and agreement with this plan.  All questions were answered.         Relevant Medications    PARoxetine (PAXIL) 10 MG tablet    Other Relevant Orders    [facility]  Referral      Follow-up Visit   Expected date:  Apr 28, 2023 (Approximate)      Follow  "Up Appointment Details:     Follow-up with whom?: PCP    Follow-Up for what?: Adult Preventive    Any Additional Chronic Condition Management?: Anxiety    How?: In Person                   Adelaida DE CHANTEL Mills CNP on 2/28/2023 at 2:23 PM      Subjective   June is a 27 year old who presents for the following health issues     Chief Complaint   Patient presents with     Medication Problem     Discuss medication side effects      -Was seen a little over a month ago and treated for presumed reflux with famotidine   -Added TUMS on to his famotidine. His stomach pressure and reflux improved. Still has occasional sensation of throat fullness, but able to tolerate PO intake. He also attempted to manage his stress and improve his diet.  -However, at the same time he felt his joints were 'sour' and some muscle pains without any exercise. More of a dull, aching pain - not necessarily severe in any way. The discomfort would move from joint to joint (hip, knee, shoulder).   -He reports a similar constellation of symptoms when his anxiety was uncontrolled.    History of Present Illness       Reason for visit:  Acid reflux    He eats 0-1 servings of fruits and vegetables daily.He consumes 0 sweetened beverage(s) daily.He exercises with enough effort to increase his heart rate 20 to 29 minutes per day.  He exercises with enough effort to increase his heart rate 4 days per week.   He is taking medications regularly.       Review of Systems         Objective    /85 (BP Location: Left arm, Patient Position: Sitting, Cuff Size: Adult Regular)   Pulse 71   Resp 16   Ht 1.708 m (5' 7.25\")   Wt 68.1 kg (150 lb 3.2 oz)   SpO2 99%   BMI 23.35 kg/m    Body mass index is 23.35 kg/m .  Physical Exam  Vitals and nursing note reviewed.   Constitutional:       General: He is not in acute distress.     Appearance: Normal appearance. He is not ill-appearing.   Cardiovascular:      Rate and Rhythm: Normal rate and regular rhythm. "   Pulmonary:      Effort: Pulmonary effort is normal. No respiratory distress.   Neurological:      Mental Status: He is alert.   Psychiatric:         Attention and Perception: Attention normal.         Mood and Affect: Mood is anxious.         Speech: Speech normal.         Behavior: Behavior normal. Behavior is cooperative.         Thought Content: Thought content normal.         Cognition and Memory: Cognition normal.         Judgment: Judgment normal.      Comments: Insightful to his health and symptoms           This note has been dictated using voice recognition software. Any grammatical or context distortions are unintentional and inherent to the software       Home

## 2024-03-07 ENCOUNTER — FCC EXTENDED DOCUMENTATION (OUTPATIENT)
Dept: PSYCHOLOGY | Facility: CLINIC | Age: 29
End: 2024-03-07

## 2024-03-07 NOTE — PROGRESS NOTES
Discharge Summary  Multiple Sessions    Client Name: Joonhyeok Ahn MRN#: [id number] YOB: 1995      Intake / Discharge Date: Intake:6/23/22; discharge: 3/7/2024      DSM5 Diagnoses: (Sustained by DSM5 Criteria Listed Above)  Diagnoses: 300.02 (F41.1) Generalized Anxiety Disorder  Psychosocial & Contextual Factors: strong support system, employed  PROMIS-10 Scores        6/23/2022     9:17 AM 4/6/2023     7:49 AM   PROMIS-10 Total Score w/o Sub Scores   PROMIS TOTAL - SUBSCORES 29 29             Presenting Concern:  anxiety      Reason for Discharge:  Client did not return      Disposition at Time of Last Encounter:   Comments:   Pt successfully worked towards treatment goals     Risk Management:   Client denies a history of suicidal ideation, suicide attempts, self-injurious behavior, homicidal ideation, homicidal behavior, and and other safety concerns  Recommended that patient call 911 or go to the local ED should there be a change in any of these risk factors.      Referred To:  Patient is encouraged to contact their insurance for mental health therapist referrals and is welcome to return to [place] for mental health therapy at any time.           STEVIE Lemus, [facility]   3/7/2024

## 2024-05-19 ENCOUNTER — HEALTH MAINTENANCE LETTER (OUTPATIENT)
Age: 29
End: 2024-05-19

## 2024-09-12 ENCOUNTER — PATIENT OUTREACH (OUTPATIENT)
Dept: CARE COORDINATION | Facility: CLINIC | Age: 29
End: 2024-09-12

## 2025-05-20 ENCOUNTER — OFFICE VISIT (OUTPATIENT)
Dept: FAMILY MEDICINE | Facility: CLINIC | Age: 30
End: 2025-05-20
Payer: COMMERCIAL

## 2025-05-20 VITALS
HEART RATE: 95 BPM | OXYGEN SATURATION: 99 % | BODY MASS INDEX: 24.25 KG/M2 | TEMPERATURE: 97.9 F | DIASTOLIC BLOOD PRESSURE: 70 MMHG | RESPIRATION RATE: 16 BRPM | HEIGHT: 68 IN | SYSTOLIC BLOOD PRESSURE: 102 MMHG | WEIGHT: 160 LBS

## 2025-05-20 DIAGNOSIS — Z13.29 SCREENING FOR THYROID DISORDER: ICD-10-CM

## 2025-05-20 DIAGNOSIS — Z11.4 SCREENING FOR HIV (HUMAN IMMUNODEFICIENCY VIRUS): ICD-10-CM

## 2025-05-20 DIAGNOSIS — Z11.59 NEED FOR HEPATITIS C SCREENING TEST: ICD-10-CM

## 2025-05-20 DIAGNOSIS — Z13.0 SCREENING FOR IRON DEFICIENCY ANEMIA: ICD-10-CM

## 2025-05-20 DIAGNOSIS — E78.00 HYPERCHOLESTEROLEMIA: ICD-10-CM

## 2025-05-20 DIAGNOSIS — Z00.00 ROUTINE GENERAL MEDICAL EXAMINATION AT A HEALTH CARE FACILITY: Primary | ICD-10-CM

## 2025-05-20 DIAGNOSIS — R73.9 HYPERGLYCEMIA: ICD-10-CM

## 2025-05-20 LAB
ALBUMIN SERPL BCG-MCNC: 4.7 G/DL (ref 3.5–5.2)
ALP SERPL-CCNC: 85 U/L (ref 40–150)
ALT SERPL W P-5'-P-CCNC: 97 U/L (ref 0–70)
ANION GAP SERPL CALCULATED.3IONS-SCNC: 10 MMOL/L (ref 7–15)
AST SERPL W P-5'-P-CCNC: 42 U/L (ref 0–45)
BILIRUB SERPL-MCNC: 0.6 MG/DL
BUN SERPL-MCNC: 15 MG/DL (ref 6–20)
CALCIUM SERPL-MCNC: 9.8 MG/DL (ref 8.8–10.4)
CHLORIDE SERPL-SCNC: 103 MMOL/L (ref 98–107)
CHOLEST SERPL-MCNC: 243 MG/DL
CREAT SERPL-MCNC: 0.94 MG/DL (ref 0.67–1.17)
EGFRCR SERPLBLD CKD-EPI 2021: >90 ML/MIN/1.73M2
ERYTHROCYTE [DISTWIDTH] IN BLOOD BY AUTOMATED COUNT: 11.2 % (ref 10–15)
EST. AVERAGE GLUCOSE BLD GHB EST-MCNC: 108 MG/DL
FASTING STATUS PATIENT QL REPORTED: YES
FASTING STATUS PATIENT QL REPORTED: YES
GLUCOSE SERPL-MCNC: 95 MG/DL (ref 70–99)
HBA1C MFR BLD: 5.4 % (ref 0–5.6)
HCO3 SERPL-SCNC: 27 MMOL/L (ref 22–29)
HCT VFR BLD AUTO: 48.9 % (ref 40–53)
HCV AB SERPL QL IA: NONREACTIVE
HDLC SERPL-MCNC: 59 MG/DL
HGB BLD-MCNC: 16.8 G/DL (ref 13.3–17.7)
HIV 1+2 AB+HIV1 P24 AG SERPL QL IA: NONREACTIVE
LDLC SERPL CALC-MCNC: 164 MG/DL
MCH RBC QN AUTO: 29.8 PG (ref 26.5–33)
MCHC RBC AUTO-ENTMCNC: 34.4 G/DL (ref 31.5–36.5)
MCV RBC AUTO: 87 FL (ref 78–100)
NONHDLC SERPL-MCNC: 184 MG/DL
PLATELET # BLD AUTO: 152 10E3/UL (ref 150–450)
POTASSIUM SERPL-SCNC: 4 MMOL/L (ref 3.4–5.3)
PROT SERPL-MCNC: 7.4 G/DL (ref 6.4–8.3)
RBC # BLD AUTO: 5.64 10E6/UL (ref 4.4–5.9)
SODIUM SERPL-SCNC: 140 MMOL/L (ref 135–145)
TRIGL SERPL-MCNC: 99 MG/DL
TSH SERPL DL<=0.005 MIU/L-ACNC: 2.72 UIU/ML (ref 0.3–4.2)
WBC # BLD AUTO: 5.6 10E3/UL (ref 4–11)

## 2025-05-20 PROCEDURE — 83036 HEMOGLOBIN GLYCOSYLATED A1C: CPT

## 2025-05-20 PROCEDURE — 85027 COMPLETE CBC AUTOMATED: CPT

## 2025-05-20 PROCEDURE — 87389 HIV-1 AG W/HIV-1&-2 AB AG IA: CPT

## 2025-05-20 PROCEDURE — 36415 COLL VENOUS BLD VENIPUNCTURE: CPT

## 2025-05-20 PROCEDURE — 80053 COMPREHEN METABOLIC PANEL: CPT

## 2025-05-20 PROCEDURE — 3078F DIAST BP <80 MM HG: CPT

## 2025-05-20 PROCEDURE — 99395 PREV VISIT EST AGE 18-39: CPT

## 2025-05-20 PROCEDURE — 86803 HEPATITIS C AB TEST: CPT

## 2025-05-20 PROCEDURE — 3074F SYST BP LT 130 MM HG: CPT

## 2025-05-20 PROCEDURE — 84443 ASSAY THYROID STIM HORMONE: CPT

## 2025-05-20 PROCEDURE — 80061 LIPID PANEL: CPT

## 2025-05-20 SDOH — HEALTH STABILITY: PHYSICAL HEALTH: ON AVERAGE, HOW MANY DAYS PER WEEK DO YOU ENGAGE IN MODERATE TO STRENUOUS EXERCISE (LIKE A BRISK WALK)?: 4 DAYS

## 2025-05-20 ASSESSMENT — SOCIAL DETERMINANTS OF HEALTH (SDOH): HOW OFTEN DO YOU GET TOGETHER WITH FRIENDS OR RELATIVES?: TWICE A WEEK

## 2025-05-20 NOTE — PROGRESS NOTES
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  This makes him anxious  He has past medical history of anxiety but has been doing well with this recently  He goes to the gym and exercises during the weekdays  He has decreased his greasy and spicy food to increase his veggies  Screenings as below  Recommend dentist twice per year  We will send patient a Yoo[employer] message with results    Should be getting 5 servings of fruits and vegetables per day, 3 servings of calcium, and 150 minutes of strenuous activity per week    Follow-up in 1 year or sooner if you have e-visits or a clinical appointment for acute complaint      - REVIEW OF HEALTH MAINTENANCE PROTOCOL ORDERS    Screening for HIV (human immunodeficiency virus)    - HIV Antigen Antibody Combo; Future  - HIV Antigen Antibody Combo    Need for hepatitis C screening test    - Hepatitis C Screen Reflex to HCV RNA Quant and Genotype; Future  - Hepatitis C Screen Reflex to HCV RNA Quant and Genotype    Hypercholesterolemia  Seen in 2023.  Unknown if fasting at that time  Will recheck    - Lipid panel reflex to direct LDL Fasting; Future  - Comprehensive metabolic panel (BMP + Alb, Alk Phos, ALT, AST, Total. Bili, TP); Future  - Lipid panel reflex to direct LDL Fasting  - Comprehensive metabolic panel (BMP + Alb, Alk Phos, ALT, AST, Total. Bili, TP)    Hyperglycemia  Seen in 2023  Recheck today with an A1c  - Comprehensive metabolic panel (BMP + Alb, Alk Phos, ALT, AST, Total. Bili, TP); Future  - Hemoglobin A1c; Future  - Comprehensive metabolic panel (BMP + Alb, Alk Phos, ALT, AST,  Total. Bili, TP)  - Hemoglobin A1c    Screening for thyroid disorder    - TSH with free T4 reflex    Screening for iron deficiency anemia    - CBC with platelets            Counseling  Appropriate preventive services were addressed with this patient via screening, questionnaire, or discussion as appropriate for fall prevention, nutrition, physical activity, Tobacco-use cessation, social engagement, weight loss and cognition.  Checklist reviewing preventive services available has been given to the patient.  Reviewed patient's diet, addressing concerns and/or questions.   The patient was instructed to see the dentist every 6 months.           Philippe De Jesus is a 30 year old, presenting for the following:  Physical (Est care. Fasting. )        5/20/2025     9:22 AM   Additional Questions   Roomed by Susan SHAVER   Accompanied by SELF          HPI      Concerns:  None    Couple years ago had some anxiety issues , was feeling burnt out  Started on anxiety medicines and did well  Once getting out of the burn out was feeling better. Will get occasional chest tightening but able to calm himself down  Had some reflux associated with this    [facility]  Patient Active Problem List    Diagnosis Date Noted    Generalized anxiety disorder 02/28/2023     Priority: Medium    Gastroesophageal reflux disease without esophagitis 01/13/2023     Priority: Medium       Meds  No current outpatient medications on file.     PSH  No past surgical history on file.      Lives with: wife. Started dating 2018 got  2021.moved to [place] from 2022. Originally from [place].  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 No unintentional weight  loss or gain  HEENT: No headache, trauma, changes in vision, hearing, nasal congestion,   Dentist:  couple years ago in [place]  Eye Doctor: [facility]  CARDIAC: Denies chest pain or shortness of breath  LUNG: Denies dyspnea on exertion or chest pain  ABD: Denies pain, nausea, vomiting, diarrhea, constipation  : No changes in bladder habits  SKIN: Denies new rashes  NEURO: No numbness, weakness, tingling   MSK: No weakness  PSYCH: No changes in mood, no HI or SI      Advance Care Planning    Discussed advance care planning with patient; informed AVS has link to [facility].        5/20/2025   General Health   How would you rate your overall physical health? Excellent   Feel stress (tense, anxious, or unable to sleep) Only a little   (!) STRESS CONCERN      5/20/2025   Nutrition   Three or more servings of calcium each day? Yes   Diet: Regular (no restrictions)   How many servings of fruit and vegetables per day? (!) 0-1   How many sweetened beverages each day? 0-1         5/20/2025   Exercise   Days per week of moderate/strenous exercise 4 days         5/20/2025   Social Factors   Frequency of gathering with friends or relatives Twice a week   Worry food won't last until get money to buy more No   Food not last or not have enough money for food? No   Do you have housing? (Housing is defined as stable permanent housing and does not include staying outside in a car, in a tent, in an abandoned building, in an overnight shelter, or couch-surfing.) Yes   Are you worried about losing your housing? No   Lack of transportation? No   Unable to get utilities (heat,electricity)? No         5/20/2025   Dental   Dentist two times every year? (!) NO         Today's PHQ-2 Score:       5/20/2025     9:15 AM   PHQ-2 ( 1999 [employer])   Q1: Little interest or pleasure in doing things 0   Q2: Feeling down, depressed or hopeless 0   PHQ-2 Score 0    Q1: Little interest or pleasure in doing things Not at all   Q2: Feeling down, depressed or  "hopeless Not at all   PHQ-2 Score 0       Patient-reported           5/20/2025   Substance Use   Alcohol more than 3/day or more than 7/wk No   Do you use any other substances recreationally? No     Social History     Tobacco Use    Smoking status: Never     Passive exposure: Never    Smokeless tobacco: Never   Vaping Use    Vaping status: Never Used             5/20/2025   One time HIV Screening   Previous HIV test? No         5/20/2025   STI Screening   New sexual partner(s) since last STI/HIV test? No         5/20/2025   Contraception/Family Planning   Questions about contraception or family planning (!) YES         Reviewed and updated as needed this visit by Provider                         Objective    Exam  /70   Pulse 95   Temp 97.9  F (36.6  C) (Oral)   Resp 16   Ht 1.727 m (5' 8\")   Wt 72.6 kg (160 lb)   SpO2 99%   BMI 24.33 kg/m     Estimated body mass index is 24.33 kg/m  as calculated from the following:    Height as of this encounter: 1.727 m (5' 8\").    Weight as of this encounter: 72.6 kg (160 lb).    Physical Exam  Vitals reviewed.   Constitutional:       Appearance: Normal appearance.   HENT:      Head: Normocephalic and atraumatic.      Right Ear: Tympanic membrane, ear canal and external ear normal.      Left Ear: Tympanic membrane, ear canal and external ear normal.      Nose: Nose normal.      Mouth/Throat:      Mouth: Mucous membranes are moist.   Eyes:      Extraocular Movements: Extraocular movements intact.      Pupils: Pupils are equal, round, and reactive to light.   Cardiovascular:      Rate and Rhythm: Normal rate and regular rhythm.      Heart sounds: Normal heart sounds.   Pulmonary:      Effort: Pulmonary effort is normal.      Breath sounds: Normal breath sounds.   Abdominal:      General: Abdomen is flat. Bowel sounds are normal.      Palpations: Abdomen is soft.   Musculoskeletal:      Cervical back: Normal range of motion and neck supple.      Comments: Appropriate " strength in tested fields   Skin:     General: Skin is warm and dry.   Neurological:      General: No focal deficit present.      Mental Status: He is alert.   Psychiatric:         Mood and Affect: Mood normal.         Behavior: Behavior normal.         Signed Electronically by: Yonatan Martinez DO

## 2025-05-20 NOTE — PATIENT INSTRUCTIONS
Thank you for seeing us at [facility].     To Review:  If you are getting lab work today, we will send you a C[employer] message with recommendations once these are all returned to us.  X-rays are able to be performed in clinic and we will notify you of the results.  Any other imaging is scheduled and you will be contacted by the scheduling department.  If you do not hear from them in 2 weeks, please call [phone]   If you are getting immunizations today, you may have some arm soreness; you can use tylenol or ibuprofen for this.  If you are getting referrals you should be called within the next 3 to 5 days.    An E visit is an excellent way to get quick evaluation from myself. These can be completed using the  [employer] or online using [employer]. We can evaluate a variety of conditions using this including sinusitis, skin conditions, etc. Please send us a [employer] Message or call if having issues or questions.    Yonatan Martinez DO, MS  [facility]  [phone]         Patient Education   Preventive Care Advice   This is general advice given by our system to help you stay healthy. However, your care team may have specific advice just for you. Please talk to your care team about your preventive care needs.  Nutrition  Eat 5 or more servings of fruits and vegetables each day.  Try wheat bread, brown rice and whole grain pasta (instead of white bread, rice, and pasta).  Get enough calcium and vitamin D. Check the label on foods and aim for 100% of the RDA (recommended daily allowance).  Lifestyle  Exercise at least 150 minutes each week  (30 minutes a day, 5 days a week).  Do muscle strengthening activities 2 days a week. These help control your weight and prevent disease.  No smoking.  Wear sunscreen to prevent skin cancer.  Have a dental exam and cleaning every 6 months.  Yearly exams  See your health care team every year to talk about:  Any changes in your  health.  Any medicines your care team has prescribed.  Preventive care, family planning, and ways to prevent chronic diseases.  Shots (vaccines)   HPV shots (up to age 26), if you've never had them before.  Hepatitis B shots (up to age 59), if you've never had them before.  COVID-19 shot: Get this shot when it's due.  Flu shot: Get a flu shot every year.  Tetanus shot: Get a tetanus shot every 10 years.  Pneumococcal, hepatitis A, and RSV shots: Ask your care team if you need these based on your risk.  Shingles shot (for age 50 and up)  General health tests  Diabetes screening:  Starting at age 35, Get screened for diabetes at least every 3 years.  If you are younger than age 35, ask your care team if you should be screened for diabetes.  Cholesterol test: At age 39, start having a cholesterol test every 5 years, or more often if advised.  Bone density scan (DEXA): At age 50, ask your care team if you should have this scan for osteoporosis (brittle bones).  Hepatitis C: Get tested at least once in your life.  STIs (sexually transmitted infections)  Before age 24: Ask your care team if you should be screened for STIs.  After age 24: Get screened for STIs if you're at risk. You are at risk for STIs (including HIV) if:  You are sexually active with more than one person.  You don't use condoms every time.  You or a partner was diagnosed with a sexually transmitted infection.  If you are at risk for HIV, ask about PrEP medicine to prevent HIV.  Get tested for HIV at least once in your life, whether you are at risk for HIV or not.  Cancer screening tests  Cervical cancer screening: If you have a cervix, begin getting regular cervical cancer screening tests starting at age 21.  Breast cancer scan (mammogram): If you've ever had breasts, begin having regular mammograms starting at age 40. This is a scan to check for breast cancer.  Colon cancer screening: It is important to start screening for colon cancer at age 45.  Have  a colonoscopy test every 10 years (or more often if you're at risk) Or, ask your provider about stool tests like a FIT test every year or Cologuard test every 3 years.  To learn more about your testing options, visit:   .  For help making a decision, visit:   https://bit.clifton/nm41968.  Prostate cancer screening test: If you have a prostate, ask your care team if a prostate cancer screening test (PSA) at age 55 is right for you.  Lung cancer screening: If you are a current or former smoker ages 50 to 80, ask your care team if ongoing lung cancer screenings are right for you.  For informational purposes only. Not to replace the advice of your health care provider. Copyright   2023 [facility]. All rights reserved. Clinically reviewed by the [facility] Transitions Program. [employer] 248697 - REV 01/24.

## 2025-05-22 ENCOUNTER — RESULTS FOLLOW-UP (OUTPATIENT)
Dept: FAMILY MEDICINE | Facility: CLINIC | Age: 30
End: 2025-05-22

## 2025-05-22 NOTE — RESULT ENCOUNTER NOTE
Cholesterol has elevated from what it was approximately 2 years ago.  Your HDL is at a good level of 59.  LDL is still elevated with 164.  This predisposes you to heart disease  ALT is an enzyme from your liver which was elevated and your CMP.  We can monitor this at this time  You do not have hepatitis C or HIV  Thyroid was within normal limits  You are not prediabetic with A1c of 5.4  The values of your blood cells are all within normal limits.  I would be hesitant to put you on medication for your cholesterol at such a young age.  Please be watching your diet.  When we do your physical in 1 year we will recheck and discuss medications        Please let us know if you have any questions.    Sincerely,    Dr. Martinez

## 2025-06-14 ENCOUNTER — MYC MEDICAL ADVICE (OUTPATIENT)
Dept: FAMILY MEDICINE | Facility: CLINIC | Age: 30
End: 2025-06-14
Payer: COMMERCIAL